# Patient Record
Sex: MALE | Race: OTHER | HISPANIC OR LATINO | URBAN - METROPOLITAN AREA
[De-identification: names, ages, dates, MRNs, and addresses within clinical notes are randomized per-mention and may not be internally consistent; named-entity substitution may affect disease eponyms.]

---

## 2020-05-26 ENCOUNTER — EMERGENCY (EMERGENCY)
Facility: HOSPITAL | Age: 30
LOS: 1 days | Discharge: HOME | End: 2020-05-26
Attending: STUDENT IN AN ORGANIZED HEALTH CARE EDUCATION/TRAINING PROGRAM
Payer: MEDICAID

## 2020-05-26 VITALS
SYSTOLIC BLOOD PRESSURE: 126 MMHG | HEART RATE: 97 BPM | TEMPERATURE: 99 F | RESPIRATION RATE: 18 BRPM | DIASTOLIC BLOOD PRESSURE: 78 MMHG | OXYGEN SATURATION: 98 %

## 2020-05-26 LAB
ALBUMIN SERPL ELPH-MCNC: 3.6 G/DL — SIGNIFICANT CHANGE UP (ref 3.3–5)
ALP SERPL-CCNC: 68 U/L — SIGNIFICANT CHANGE UP (ref 40–120)
ALT FLD-CCNC: 24 U/L — SIGNIFICANT CHANGE UP (ref 4–41)
ANION GAP SERPL CALC-SCNC: 14 MMO/L — SIGNIFICANT CHANGE UP (ref 7–14)
AST SERPL-CCNC: 33 U/L — SIGNIFICANT CHANGE UP (ref 4–40)
BASE EXCESS BLDV CALC-SCNC: 6 MMOL/L — SIGNIFICANT CHANGE UP
BASOPHILS # BLD AUTO: 0.05 K/UL — SIGNIFICANT CHANGE UP (ref 0–0.2)
BASOPHILS NFR BLD AUTO: 0.7 % — SIGNIFICANT CHANGE UP (ref 0–2)
BILIRUB SERPL-MCNC: 0.3 MG/DL — SIGNIFICANT CHANGE UP (ref 0.2–1.2)
BLOOD GAS VENOUS - CREATININE: SIGNIFICANT CHANGE UP MG/DL (ref 0.5–1.3)
BLOOD GAS VENOUS - FIO2: 21 — SIGNIFICANT CHANGE UP
BUN SERPL-MCNC: 6 MG/DL — LOW (ref 7–23)
CALCIUM SERPL-MCNC: 8.5 MG/DL — SIGNIFICANT CHANGE UP (ref 8.4–10.5)
CHLORIDE BLDV-SCNC: 99 MMOL/L — SIGNIFICANT CHANGE UP (ref 96–108)
CHLORIDE SERPL-SCNC: 98 MMOL/L — SIGNIFICANT CHANGE UP (ref 98–107)
CO2 SERPL-SCNC: 27 MMOL/L — SIGNIFICANT CHANGE UP (ref 22–31)
CREAT SERPL-MCNC: 0.77 MG/DL — SIGNIFICANT CHANGE UP (ref 0.5–1.3)
EOSINOPHIL # BLD AUTO: 0.1 K/UL — SIGNIFICANT CHANGE UP (ref 0–0.5)
EOSINOPHIL NFR BLD AUTO: 1.3 % — SIGNIFICANT CHANGE UP (ref 0–6)
GAS PNL BLDV: 138 MMOL/L — SIGNIFICANT CHANGE UP (ref 136–146)
GLUCOSE BLDV-MCNC: 106 MG/DL — HIGH (ref 70–99)
GLUCOSE SERPL-MCNC: 111 MG/DL — HIGH (ref 70–99)
HCO3 BLDV-SCNC: 29 MMOL/L — HIGH (ref 20–27)
HCT VFR BLD CALC: 40.7 % — SIGNIFICANT CHANGE UP (ref 39–50)
HCT VFR BLDV CALC: 42.8 % — SIGNIFICANT CHANGE UP (ref 39–51)
HGB BLD-MCNC: 13.6 G/DL — SIGNIFICANT CHANGE UP (ref 13–17)
HGB BLDV-MCNC: 13.9 G/DL — SIGNIFICANT CHANGE UP (ref 13–17)
IMM GRANULOCYTES NFR BLD AUTO: 0.3 % — SIGNIFICANT CHANGE UP (ref 0–1.5)
LACTATE BLDV-MCNC: 0.9 MMOL/L — SIGNIFICANT CHANGE UP (ref 0.5–2)
LYMPHOCYTES # BLD AUTO: 2.38 K/UL — SIGNIFICANT CHANGE UP (ref 1–3.3)
LYMPHOCYTES # BLD AUTO: 31.7 % — SIGNIFICANT CHANGE UP (ref 13–44)
MCHC RBC-ENTMCNC: 28.3 PG — SIGNIFICANT CHANGE UP (ref 27–34)
MCHC RBC-ENTMCNC: 33.4 % — SIGNIFICANT CHANGE UP (ref 32–36)
MCV RBC AUTO: 84.6 FL — SIGNIFICANT CHANGE UP (ref 80–100)
MONOCYTES # BLD AUTO: 0.86 K/UL — SIGNIFICANT CHANGE UP (ref 0–0.9)
MONOCYTES NFR BLD AUTO: 11.5 % — SIGNIFICANT CHANGE UP (ref 2–14)
NEUTROPHILS # BLD AUTO: 4.09 K/UL — SIGNIFICANT CHANGE UP (ref 1.8–7.4)
NEUTROPHILS NFR BLD AUTO: 54.5 % — SIGNIFICANT CHANGE UP (ref 43–77)
NRBC # FLD: 0 K/UL — SIGNIFICANT CHANGE UP (ref 0–0)
PCO2 BLDV: 48 MMHG — SIGNIFICANT CHANGE UP (ref 41–51)
PH BLDV: 7.42 PH — SIGNIFICANT CHANGE UP (ref 7.32–7.43)
PLATELET # BLD AUTO: 264 K/UL — SIGNIFICANT CHANGE UP (ref 150–400)
PMV BLD: 9.6 FL — SIGNIFICANT CHANGE UP (ref 7–13)
PO2 BLDV: 76 MMHG — HIGH (ref 35–40)
POTASSIUM BLDV-SCNC: 2.7 MMOL/L — CRITICAL LOW (ref 3.4–4.5)
POTASSIUM SERPL-MCNC: 2.8 MMOL/L — CRITICAL LOW (ref 3.5–5.3)
POTASSIUM SERPL-SCNC: 2.8 MMOL/L — CRITICAL LOW (ref 3.5–5.3)
PROT SERPL-MCNC: 6.2 G/DL — SIGNIFICANT CHANGE UP (ref 6–8.3)
RBC # BLD: 4.81 M/UL — SIGNIFICANT CHANGE UP (ref 4.2–5.8)
RBC # FLD: 13.3 % — SIGNIFICANT CHANGE UP (ref 10.3–14.5)
SAO2 % BLDV: 95.3 % — HIGH (ref 60–85)
SODIUM SERPL-SCNC: 139 MMOL/L — SIGNIFICANT CHANGE UP (ref 135–145)
WBC # BLD: 7.5 K/UL — SIGNIFICANT CHANGE UP (ref 3.8–10.5)
WBC # FLD AUTO: 7.5 K/UL — SIGNIFICANT CHANGE UP (ref 3.8–10.5)

## 2020-05-26 PROCEDURE — 99284 EMERGENCY DEPT VISIT MOD MDM: CPT

## 2020-05-26 PROCEDURE — 71045 X-RAY EXAM CHEST 1 VIEW: CPT | Mod: 26

## 2020-05-26 RX ORDER — KETOROLAC TROMETHAMINE 30 MG/ML
15 SYRINGE (ML) INJECTION ONCE
Refills: 0 | Status: DISCONTINUED | OUTPATIENT
Start: 2020-05-26 | End: 2020-05-26

## 2020-05-26 RX ORDER — ONDANSETRON 8 MG/1
4 TABLET, FILM COATED ORAL ONCE
Refills: 0 | Status: COMPLETED | OUTPATIENT
Start: 2020-05-26 | End: 2020-05-26

## 2020-05-26 RX ORDER — POTASSIUM CHLORIDE 20 MEQ
40 PACKET (EA) ORAL ONCE
Refills: 0 | Status: COMPLETED | OUTPATIENT
Start: 2020-05-26 | End: 2020-05-26

## 2020-05-26 RX ORDER — SODIUM CHLORIDE 9 MG/ML
1000 INJECTION, SOLUTION INTRAVENOUS
Refills: 0 | Status: DISCONTINUED | OUTPATIENT
Start: 2020-05-26 | End: 2020-05-27

## 2020-05-26 RX ORDER — FAMOTIDINE 10 MG/ML
20 INJECTION INTRAVENOUS ONCE
Refills: 0 | Status: COMPLETED | OUTPATIENT
Start: 2020-05-26 | End: 2020-05-26

## 2020-05-26 RX ORDER — SODIUM CHLORIDE 9 MG/ML
1000 INJECTION INTRAMUSCULAR; INTRAVENOUS; SUBCUTANEOUS ONCE
Refills: 0 | Status: COMPLETED | OUTPATIENT
Start: 2020-05-26 | End: 2020-05-26

## 2020-05-26 RX ORDER — POTASSIUM CHLORIDE 20 MEQ
10 PACKET (EA) ORAL
Refills: 0 | Status: DISCONTINUED | OUTPATIENT
Start: 2020-05-26 | End: 2020-05-30

## 2020-05-26 RX ADMIN — SODIUM CHLORIDE 1000 MILLILITER(S): 9 INJECTION INTRAMUSCULAR; INTRAVENOUS; SUBCUTANEOUS at 21:48

## 2020-05-26 RX ADMIN — Medication 40 MILLIEQUIVALENT(S): at 23:50

## 2020-05-26 RX ADMIN — Medication 15 MILLIGRAM(S): at 23:44

## 2020-05-26 RX ADMIN — FAMOTIDINE 20 MILLIGRAM(S): 10 INJECTION INTRAVENOUS at 21:47

## 2020-05-26 RX ADMIN — SODIUM CHLORIDE 1000 MILLILITER(S): 9 INJECTION INTRAMUSCULAR; INTRAVENOUS; SUBCUTANEOUS at 23:13

## 2020-05-26 RX ADMIN — ONDANSETRON 4 MILLIGRAM(S): 8 TABLET, FILM COATED ORAL at 21:48

## 2020-05-26 NOTE — ED PROVIDER NOTE - NSFOLLOWUPINSTRUCTIONS_ED_ALL_ED_FT
Zofran 4mg disintegrating tablet as needed for nausea or vomiting  Plenty of liquids  Citrus fruits, greens, bananas  Your potassium was very low in the Emergency Department.
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
## 2020-05-26 NOTE — ED PROVIDER NOTE - PATIENT PORTAL LINK FT
You can access the FollowMyHealth Patient Portal offered by [facility] by registering at the following website: [email or web address]. By joining [employer]’s FollowMyHealth portal, you will also be able to view your health information using other applications (apps) compatible with our system.

## 2020-05-26 NOTE — ED ADULT TRIAGE NOTE - CHIEF COMPLAINT QUOTE
Pt is c/o abdominal pain and vomiting several times since more than a month. Pt says he was seen at [facility] 5 days ago after he had passed out and he was told that his kidney function is not good but he signed out from the hospital . Denies any PMH. Not on any medications.

## 2020-05-26 NOTE — ED PROVIDER NOTE - ATTENDING CONTRIBUTION TO CARE
Seen and examined, well-developed M c/o episodic vomiting for nearly 1 mo., worse recently, states has been able to keep down po liquids in between but today had many episodes of vomiting. Denies fever/chills, no abd pain/back pain, no diarrhea. No post tussive emesis, no melena, no BRBPR, states has dark urine but still making normal amt. of urine. MMM, clear lungs, heart reg, abd soft, NT to palp, no CVAt, no edema, NT calves. Seen and examined, well-developed M c/o episodic vomiting for nearly 1 mo., worse recently, states has been able to keep down po liquids in between but today had many episodes of vomiting. Denies fever/chills, no abd pain/back pain, no diarrhea. No post tussive emesis, no melena, no BRBPR, states has dark urine but still making normal amt. of urine. MMM, clear lungs, heart reg, abd soft, NT to palp, no CVAt, no edema, NT  calves.

## 2020-05-26 NOTE — ED ADULT NURSE NOTE - OBJECTIVE STATEMENT
Pt comes in for N/V that has been ongoing "for months". Pt states today he ate eggs at 9am and after had several vomiting episodes. Pt appears in no distress at this time and noted to have even and unlabored respirations. Pt axo4 and ambulatory with a steady gait and without assistance. Pt states he has not been taking his medication (suboxone, seroquel) at home for about 2 weeks now. will continue to monitor pt. Will continue to monitor.

## 2020-05-26 NOTE — ED ADULT NURSE NOTE - CHIEF COMPLAINT QUOTE
Pt is c/o abdominal pain and vomiting several times since more than a month. Pt says he was seen at [facility] 5 days ago after he had passed out and he was told that his kidney function is not good but he signed out from the hospital . Denies any PMH. Not on any medications.

## 2020-05-27 VITALS
TEMPERATURE: 97 F | OXYGEN SATURATION: 98 % | DIASTOLIC BLOOD PRESSURE: 82 MMHG | RESPIRATION RATE: 18 BRPM | HEART RATE: 82 BPM | SYSTOLIC BLOOD PRESSURE: 132 MMHG

## 2020-05-27 RX ORDER — ONDANSETRON 8 MG/1
1 TABLET, FILM COATED ORAL
Qty: 15 | Refills: 0
Start: 2020-05-27 | End: 2020-05-31

## 2020-05-27 RX ADMIN — Medication 10 MILLIEQUIVALENT(S): at 00:50

## 2020-05-27 RX ADMIN — Medication 100 MILLIEQUIVALENT(S): at 00:00

## 2020-05-27 NOTE — ED ADULT NURSE REASSESSMENT NOTE - NS ED NURSE REASSESS COMMENT FT1
Pt states potassium burns too much. Potassium set at a slower rate. Pt states "I want this out, I don't want to be here, can you please call the doctor". Pt explained the risks of not receiving potassium. Potassium on hold at this time. Provider made aware. Will continue to monitor. Detail Level: Zone Modify Regimen: Isotretinoin 40md QD due to Pt request because of dryness Initiate Treatment: Valtrex

## 2020-10-31 ENCOUNTER — EMERGENCY (EMERGENCY)
Facility: HOSPITAL | Age: 30
LOS: 1 days | Discharge: HOME | End: 2020-10-31
Attending: EMERGENCY MEDICINE | Admitting: EMERGENCY MEDICINE
Payer: MEDICAID

## 2020-10-31 VITALS
DIASTOLIC BLOOD PRESSURE: 67 MMHG | HEIGHT: 69 IN | OXYGEN SATURATION: 96 % | HEART RATE: 70 BPM | RESPIRATION RATE: 16 BRPM | SYSTOLIC BLOOD PRESSURE: 111 MMHG | TEMPERATURE: 99 F

## 2020-10-31 VITALS
RESPIRATION RATE: 16 BRPM | OXYGEN SATURATION: 100 % | SYSTOLIC BLOOD PRESSURE: 113 MMHG | DIASTOLIC BLOOD PRESSURE: 51 MMHG | TEMPERATURE: 99 F | HEART RATE: 61 BPM

## 2020-10-31 PROBLEM — F31.9 BIPOLAR DISORDER, UNSPECIFIED: Chronic | Status: ACTIVE | Noted: 2020-05-26

## 2020-10-31 LAB
ANION GAP SERPL CALC-SCNC: 8 MMO/L — SIGNIFICANT CHANGE UP (ref 7–14)
BUN SERPL-MCNC: 13 MG/DL — SIGNIFICANT CHANGE UP (ref 7–23)
CALCIUM SERPL-MCNC: 8.8 MG/DL — SIGNIFICANT CHANGE UP (ref 8.4–10.5)
CHLORIDE SERPL-SCNC: 103 MMOL/L — SIGNIFICANT CHANGE UP (ref 98–107)
CK SERPL-CCNC: 699 U/L — HIGH (ref 30–200)
CO2 SERPL-SCNC: 26 MMOL/L — SIGNIFICANT CHANGE UP (ref 22–31)
CREAT SERPL-MCNC: 0.77 MG/DL — SIGNIFICANT CHANGE UP (ref 0.5–1.3)
GLUCOSE SERPL-MCNC: 97 MG/DL — SIGNIFICANT CHANGE UP (ref 70–99)
POTASSIUM SERPL-MCNC: 4 MMOL/L — SIGNIFICANT CHANGE UP (ref 3.5–5.3)
POTASSIUM SERPL-SCNC: 4 MMOL/L — SIGNIFICANT CHANGE UP (ref 3.5–5.3)
SODIUM SERPL-SCNC: 137 MMOL/L — SIGNIFICANT CHANGE UP (ref 135–145)

## 2020-10-31 PROCEDURE — 72125 CT NECK SPINE W/O DYE: CPT | Mod: 26

## 2020-10-31 PROCEDURE — 70486 CT MAXILLOFACIAL W/O DYE: CPT | Mod: 26

## 2020-10-31 PROCEDURE — 70450 CT HEAD/BRAIN W/O DYE: CPT | Mod: 26

## 2020-10-31 PROCEDURE — 99284 EMERGENCY DEPT VISIT MOD MDM: CPT | Mod: 25

## 2020-10-31 PROCEDURE — 12013 RPR F/E/E/N/L/M 2.6-5.0 CM: CPT
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
## 2020-10-31 NOTE — ED PROVIDER NOTE - PATIENT PORTAL LINK FT
You can access the FollowMyHealth Patient Portal offered by [facility] by registering at the following website: [email or web address]. By joining [employer]’s FollowMyHealth portal, you will also be able to view your health information using other applications (apps) compatible with our system.

## 2020-10-31 NOTE — ED PROVIDER NOTE - PHYSICAL EXAMINATION
Skin: 1.5cm superficial linear laceration below left eyelid  +deep laceration to upper inner lip  +ecchymoses and mild swelling to left orbit, EOMs intact  +pinpoint pupils on exam Skin: 1.5cm superficial linear laceration below left eyelid  +deep laceration to upper inner lip  +ecchymoses and mild swelling to left orbit, EOMs intact  +pinpoint pupils on exam  no midline spinal tenderness, FROM of upper and lower extremities, sensation intact and equal b/l Skin: 1.5cm superficial linear laceration below left eyelid  +deep laceration to upper inner lip  +ecchymoses and mild swelling to left orbit, EOMs intact  +pinpoint pupils on exam  no midline spinal tenderness, FROM of upper and lower extremities, sensation intact and equal b/l  distal pulses intact and equal, FROM of hands, no bony tenderness, sensation intact and equal

## 2020-10-31 NOTE — ED ADULT NURSE NOTE - INTERVENTIONS DEFINITIONS
Non-slip footwear when patient is off stretcher/Monitor gait and stability/Stretcher in lowest position, wheels locked, appropriate side rails in place/Instruct patient to call for assistance/Physically safe environment: no spills, clutter or unnecessary equipment
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
## 2020-10-31 NOTE — ED PROVIDER NOTE - NSFOLLOWUPINSTRUCTIONS_ED_ALL_ED_FT
Follow up with your primary care doctor within 1 week  Take Ibuprofen 600mg every 8 hours as needed for pain, take with food  The sutures in your lip will dissolve  Return to the ER with any worsening or concerning symptoms, increased pain, numbness, weakness or any other concerns. Follow up with your primary care doctor within 1 week  Take Ibuprofen 600mg every 8 hours as needed for pain, take with food  The sutures in your lip will dissolve  Return to the ER with any worsening or concerning symptoms, increased pain, numbness, weakness, vomiting, changes to your vision or any other concerns. Follow up with your primary care doctor within 1 week  Take Ibuprofen 600mg every 8 hours as needed for pain, take with food  The sutures in your lip will dissolve  Drink plenty of fluids  Return to the ER with any worsening or concerning symptoms, increased pain, numbness, weakness, vomiting, changes to your vision or any other concerns.
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
## 2022-01-20 NOTE — ED PROVIDER NOTE - NSTIMEPROVIDERCAREINITIATE_GEN_ER
67 Y M former smoker with pmh PMH afib (Eliquis last dose____), HLD, CAD, HFpEF, PAD (s/p LLE SFA/pop/DANIE/pop/DANIE PTA, PCI SFA, DCB SFA and pop 12/23/2021 @[facility] cardiology office; RSFA severe disease BTK) who presents for peripheral catheterization secondary to [place] IV symptoms in the setting of known hx of PAD.     ASA III Mallampati III  Precath consented  Started IVF NS @ ---cc/h  Took daily dose of Plavix 75mg POx1 and ASA 81mg POX1 this AM    Patient is a candidate for moderate sedation.     Risks & benefits of procedure and alternative therapy have been explained to the patient including but not limited to: allergic reaction, bleeding w/possible need for blood transfusion, infection, renal and vascular compromise, limb damage, arrhythmia, stroke, vessel dissection/perforation, Myocardial infarction, emergent CABG. Informed consent obtained and in chart.  26-May-2020 21:10 67 Y M former smoker with pmh PMH afib (Eliquis last dose____), HLD, CAD, HFpEF, PAD (s/p LLE SFA/pop/DANIE/pop/DANIE PTA, PCI SFA, DCB SFA and pop 12/23/2021 @[facility] cardiology office; RSFA severe disease BTK) who presents for peripheral catheterization secondary to [place] IV symptoms in the setting of known hx of PAD.     ASA III Mallampati III  Precath consented  Started IVF NS @ 300cc/h as per protocol  Took daily dose of ASA 81mg POX1 this AM and given daily dose of Plavix 75mg POx1     Patient is a candidate for moderate sedation.     Risks & benefits of procedure and alternative therapy have been explained to the patient including but not limited to: allergic reaction, bleeding w/possible need for blood transfusion, infection, renal and vascular compromise, limb damage, arrhythmia, stroke, vessel dissection/perforation, Myocardial infarction, emergent CABG. Informed consent obtained and in chart.  67 Y M former smoker with pmh PMH afib (Eliquis last dose____), HLD, CAD, HFpEF, PAD (s/p LLE SFA/pop/DANIE/pop/DANIE PTA, PCI SFA, DCB SFA and pop 12/23/2021 @[facility] cardiology office; RSFA severe disease BTK) who presents for peripheral catheterization secondary to [place] IV symptoms in the setting of known hx of PAD.     ASA III Mallampati III  Precath consented  Started IVF NS @ 300cc/h as per protocol  Took daily dose of ASA 81mg POX1 this AM and given daily dose of Plavix 75mg POx1   K 3.6 repleted with KCL 40meq POx1    Patient is a candidate for moderate sedation.     Risks & benefits of procedure and alternative therapy have been explained to the patient including but not limited to: allergic reaction, bleeding w/possible need for blood transfusion, infection, renal and vascular compromise, limb damage, arrhythmia, stroke, vessel dissection/perforation, Myocardial infarction, emergent CABG. Informed consent obtained and in chart.

## 2022-05-27 ENCOUNTER — EMERGENCY (EMERGENCY)
Facility: HOSPITAL | Age: 32
LOS: 1 days | Discharge: HOME | End: 2022-05-27
Admitting: EMERGENCY MEDICINE
Payer: MEDICAID

## 2022-05-27 VITALS
TEMPERATURE: 100 F | SYSTOLIC BLOOD PRESSURE: 119 MMHG | OXYGEN SATURATION: 100 % | RESPIRATION RATE: 18 BRPM | DIASTOLIC BLOOD PRESSURE: 71 MMHG | HEIGHT: 69 IN | HEART RATE: 97 BPM

## 2022-05-27 PROCEDURE — 99285 EMERGENCY DEPT VISIT HI MDM: CPT

## 2022-05-27 PROCEDURE — 99053 MED SERV 10PM-8AM 24 HR FAC: CPT

## 2022-05-27 PROCEDURE — 70450 CT HEAD/BRAIN W/O DYE: CPT | Mod: 26,MA

## 2022-05-27 PROCEDURE — 72125 CT NECK SPINE W/O DYE: CPT | Mod: 26,MA

## 2022-05-27 RX ORDER — IBUPROFEN 200 MG
600 TABLET ORAL ONCE
Refills: 0 | Status: COMPLETED | OUTPATIENT
Start: 2022-05-27 | End: 2022-05-27

## 2022-05-27 NOTE — ED PROVIDER NOTE - PROGRESS NOTE DETAILS
Lizbeth Strickland DO (PGY1): Pt signed out to me by night team. Pt resting comfortably in bed. Ambulating in ED. Pt feels ready for discharge. Pending CT read. Lizbeth Strickland DO (PGY1): Pt with minimal pain on exam, continues to ambulate in ED with full neck ROM and no paresthesias. Pt would like to be dc'd. CT cervical spine negative for fracture. CT head showing disconjugate gaze. Pt made aware of lab and imaging results. Questions regarding their symptoms were addressed. Advised to follow up with primary care and optho. Given strict return precautions. Pt verbalized understanding. Lizbeth Strickland DO (PGY1): Pt with minimal pain on exam, continues to ambulate in ED with full neck ROM and no paresthesias. Pt would like to be dc'd. CT cervical spine negative for fracture. CT head showing disconjugate gaze. Pt with disconjugate gaze of R eye. Pt reports 2 mo of blurred vision and double vision. Advised that he should stay for ophthalmology cs. Pt understands and does not want to stay, states he will return to ED if any new vision changes occur. Will have hospital call him to set up urgent optho apt. Pt made aware of lab and imaging results. Questions regarding their symptoms were addressed. Advised to follow up with primary care and optho. Given strict return precautions. Pt verbalized understanding. Dr. Vital: Pt signed out to me at 7 AM from CLA[facility] Lerma with plan to follow up imaging.  Clinically pt well, walking around ED without difficulty, with nontender neck, and 5/5 strength in all 4 extremities, and no ataxia.  Imaging showing dysconjugate gaze.  I spoke to pt and he states that he has had right eye out of line with left eye for "years", and states that he sometimes sees "double" but that it has been present for "a few months", knows that it is because "one of the muscles isn't right", never saw an ophthalmologist because he was in [facility] and did not have access.  I discussed with pt that this is concerning and if he would be willing to wait for an ophthalmologist (although not an acute issue, given pt's social determinants of health, would help expedite care) we can call one.  Pt adamantly refusing to wait, states that he has to leave to  daughter.  Pt states he is not driving.  I emphasized importance of follow up with ophthalmology and pt appeared ot understand (states he knows he needs to see a doctor and not an ), will use discharge center to help arrange for opthalmology.

## 2022-05-27 NOTE — ED PROVIDER NOTE - ATTENDING APP SHARED VISIT CONTRIBUTION OF CARE
Dr. Vital: Pt signed out to me at 7 AM from [facility] with plan to follow up imaging.  My involved in this patient's care is documented in the progress note above.  Unless otherwise documented, I agree with the PA's evaluation, assessment, plan and documentation.

## 2022-05-27 NOTE — ED PROVIDER NOTE - NSFOLLOWUPCLINICS_GEN_ALL_ED_FT
[facility] - Ophthalmology  Ophthalmology  [address], [address]  [place] [address]  Phone: [phone]  Fax:     [facility] Ophthalmology  Ophthalmology  [address], [address]  [place] [address]  Phone: [phone]  Fax:

## 2022-05-27 NOTE — ED PROVIDER NOTE - NS ED ATTENDING STATEMENT MOD
The patient has been examined and the H&P has been reviewed:    I concur with the findings and no changes have occurred since H&P was written.    Anesthesia/Surgery risks, benefits and alternative options discussed and understood by patient/family.          Active Hospital Problems    Diagnosis  POA    Chronic pain [G89.29]  Yes      Resolved Hospital Problems   No resolved problems to display.      This was a shared visit with the ERIN. I reviewed and verified the documentation and independently performed the documented:

## 2022-05-27 NOTE — ED PROVIDER NOTE - NSFOLLOWUPINSTRUCTIONS_ED_ALL_ED_FT
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
## 2022-05-27 NOTE — ED PROVIDER NOTE - PATIENT PORTAL LINK FT
You can access the FollowMyHealth Patient Portal offered by [facility] by registering at the following website: [email or web address]. By joining [employer]’s FollowMyHealth portal, you will also be able to view your health information using other applications (apps) compatible with our system.
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
## 2022-07-06 NOTE — ED PROVIDER NOTE - NSICDXNOPASTSURGICALHX_GEN_ALL_ED
----- Message from Mike Grimes sent at 7/6/2022  9:29 AM EDT -----  Subject: Message to Provider    QUESTIONS  Information for Provider? Patient is requesting a refill on her   Minocin(minocycline), 100 mg tablets, taking 1 tablet daily Please refill   to [address]   ---------------------------------------------------------------------------  --------------  Chyna PEOPLES  [phone]; OK to leave message on voicemail  ---------------------------------------------------------------------------  --------------  SCRIPT ANSWERS  Relationship to Patient?  Self <-- Click to add NO significant Past Surgical History

## 2023-08-06 NOTE — ED PROVIDER NOTE - NS ED MD DISPO DISCHARGE CCDA
Patient/Caregiver provided printed discharge information.
Simple: Patient demonstrates quick and easy understanding/Verbalized Understanding

## 2025-01-14 ENCOUNTER — INPATIENT (INPATIENT)
Facility: HOSPITAL | Age: 35
LOS: 0 days | Discharge: LEFT BEFORE DISCHARGE | End: 2025-01-15
Attending: INTERNAL MEDICINE | Admitting: INTERNAL MEDICINE
Payer: MEDICAID

## 2025-01-14 VITALS
OXYGEN SATURATION: 99 % | HEART RATE: 106 BPM | TEMPERATURE: 98 F | RESPIRATION RATE: 18 BRPM | DIASTOLIC BLOOD PRESSURE: 88 MMHG | SYSTOLIC BLOOD PRESSURE: 142 MMHG | WEIGHT: 145.06 LBS

## 2025-01-14 PROCEDURE — 99285 EMERGENCY DEPT VISIT HI MDM: CPT

## 2025-01-14 NOTE — ED PROVIDER NOTE - NSFOLLOWUPINSTRUCTIONS_ED_ALL_ED_FT
YOU RECEIVED LOVENOX TODAY AND SHOULD TAKE 10 MG OF COUMADIN TONIGHT AND FOLLOWUP WITH YOUR DOCTORS FOR FURTHER MANAGEMENT    Deep Vein Thrombosis WHAT YOU NEED TO KNOW:    Deep vein thrombosis (DVT) is a blood clot that forms in a deep vein of the body. The DVT can break into smaller pieces and travel to your lungs and cause a blockage called a pulmonary embolism. A PE can become life-threatening. It is important to go to all follow-up appointments and to take blood thinners as directed. This is especially important if you were discharged home from the emergency department.  Thrombus and Embolus    DISCHARGE INSTRUCTIONS:    Call your local emergency number (911 in the [place]) if:    You feel lightheaded, short of breath, and have chest pain.    You cough up blood.  Call your doctor if:    Your arm or leg feels warm, tender, and painful. It may look swollen and red.    You have questions or concerns about your condition or care.  Medicines:    Blood thinners help prevent blood clots. Clots can cause strokes, heart attacks, and death. The following are general safety guidelines to follow while you are taking a blood thinner:  Watch for bleeding and bruising while you take blood thinners. Watch for bleeding from your gums or nose. Watch for blood in your urine and bowel movements. Use a soft washcloth on your skin, and a soft toothbrush to brush your teeth. This can keep your skin and gums from bleeding. If you shave, use an electric shaver. Do not play contact sports.    Tell your dentist and other healthcare providers that you take a blood thinner. Wear a bracelet or necklace that says you take this medicine.    Do not start or stop any other medicines unless your healthcare provider tells you to. Many medicines cannot be used with blood thinners.    Take your blood thinner exactly as prescribed by your healthcare provider. Do not skip does or take less than prescribed. Tell your provider right away if you forget to take your blood thinner, or if you take too much.    Warfarin is a blood thinner that you may need to take. The following are things you should be aware of if you take warfarin:  Foods and medicines can affect the amount of warfarin in your blood. Do not make major changes to your diet while you take warfarin. Warfarin works best when you eat about the same amount of vitamin K every day. Vitamin K is found in green leafy vegetables and certain other foods. Ask for more information about what to eat when you are taking warfarin.    You will need to see your healthcare provider for follow-up visits when you are on warfarin. You will need regular blood tests. These tests are used to decide how much medicine you need.    Take your medicine as directed. Contact your healthcare provider if you think your medicine is not helping or if you have side effects. Tell him or her if you are allergic to any medicine. Keep a list of the medicines, vitamins, and herbs you take. Include the amounts, and when and why you take them. Bring the list or the pill bottles to follow-up visits. Carry your medicine list with you in case of an emergency.  Manage a DVT:    Wear pressure stockings as directed. The stockings put pressure on your legs. This improves blood flow and helps prevent clots. Wear the stockings during the day. Do not wear them when you sleep.  Pressure Stockings       Elevate your legs above the level of your heart. Elevate your legs when you sit or lie down, as often as you can. This will help decrease swelling and pain. Prop your legs on pillows or blankets to keep them elevated comfortably.  Elevate Leg  Prevent a DVT:    Exercise regularly to help increase your blood flow. Walking is a good low-impact exercise. Talk to your healthcare provider about the best exercise plan for you.  Walking for Exercise      Change your body position or move around often. Move and stretch in your seat several times each hour if you travel by car or work at a desk. In an airplane, get up and walk every hour. Move your legs by tightening and releasing your leg muscles while sitting. You can move your legs while sitting by raising and lowering your heels. Keep your toes on the floor while you do this. You can also raise and lower your toes while keeping your heels on the floor.  DVT Prevention Heel Raise  DVT Prevention Toe Raise      Maintain a healthy weight. Ask your healthcare provider how much you should weigh. Ask him or her to help you create a weight loss plan if you are overweight.    Do not smoke. Nicotine and other chemicals in cigarettes and cigars can damage blood vessels and make it more difficult to manage your DVT. Ask your healthcare provider for information if you currently smoke and need help to quit. E-cigarettes or smokeless tobacco still contain nicotine. Talk to your healthcare provider before you use these products.    Ask about birth control if you are a woman who takes the pill. A birth control pill increases the risk for PE in certain women. The risk is higher if you are also older than 35, smoke cigarettes, or have a blood clotting disorder. Talk to your healthcare provider about other ways to prevent pregnancy, such as a cervical cap or intrauterine device (IUD).  Follow up with your doctor or specialist as directed: You may need to come in regularly for scans to check for blood clots. Your blood may checked to see how long it takes to clot. Your doctor or specialist will tell you if you need to have this test and how often to have it. Write down your questions so you remember to ask them during your visits.    USTED RECIBIÓ LOVENOX JEREMIAH Y DEBE BOBBY 10 MG DE COUMADIN ESTA NOCHE Y SEGUIMIENTO CON LINDA MÉDICOS PARA UN TRATAMIENTO ADICIONAL    Trombosis venosa profunda LO QUE NECESITA SABER:    La trombosis venosa profunda (TVP) es un coágulo de stephanie que se forma en jose juan vena profunda del cuerpo. La TVP puede romperse en pedazos más pequeños y viajar a linda pulmones y causar un bloqueo llamado embolia pulmonar. Jose Juan EP puede poner en peligro la sean. Es importante asistir a todas las citas de seguimiento y bobby anticoagulantes según las indicaciones. [place] es especialmente importante si fue dado de pam del departamento de emergencias.  Trombo y émbolo    INSTRUCCIONES DE DESCARGA:    Llame a thomas número de emergencia local (911 en los [place] Si:    Se siente mareado, le falta el aire y tiene dolor en el pecho.    Tose stephanie.  Llame a thomas médico si:    Thomas brazo o pierna se siente caliente, sensible y dolorido. Puede verse hinchado y enrojecido.    Tiene preguntas o inquietudes sobre thomas afección o atención.  Medicamentos:    Los anticoagulantes ayudan a prevenir los coágulos de stephanie. Los coágulos pueden causar accidentes cerebrovasculares, ataques cardíacos y la muerte. Las siguientes son pautas generales de seguridad que debe seguir mientras abdullahi un anticoagulante:  Esté atento a sangrado y hematomas mientras abdullahi anticoagulantes. Esté atento a sangrado de linda encías o nariz. Esté atento a la stephanie en la orina y las deposiciones. Use un paño suave sobre la piel y un cepillo de dientes suave para cepillarse los dientes. [place] puede evitar que le sangren la piel y las encías. Si te afeitas, usa jose juan afeitadora eléctrica. No practique deportes de contacto.    Informe a thomas dentista y a otros proveedores de atención médica que abdullahi un anticoagulante. Use jose juan pulsera o collar que diga que abdullahi ananda medicamento.    No empiece ni deje de bobby ningún otro medicamento a menos que se lo indique thomas proveedor de atención médica. Muchos medicamentos no se pueden usar con anticoagulantes.    [place] thomas anticoagulante exactamente waqas lo recetó thomas proveedor de atención médica. No omita dosis ni tome menos de lo recetado. Informe a thomas proveedor de inmediato si olvida bobby thomas anticoagulante o si abdullahi demasiado.    La warfarina es un anticoagulante que es posible que deba bobby. Las siguientes son cosas que debe tener en cuenta si abdullahi warfarina:  Los alimentos y los medicamentos pueden afectar la cantidad de warfarina en stephanie. No realice cambios importantes en thomas dieta mientras abdullahi warfarina. La warfarina funciona mejor cuando consume aproximadamente la misma cantidad de vitamina K todos los días. La vitamina K se encuentra en las verduras de hoja tara y en algunos otros alimentos. Pida más información sobre qué comer cuando esté tomando warfarina.    Deberá marilou a thomas proveedor de atención médica para visitas de seguimiento cuando esté tomando warfarina. Necesitará análisis de stephanie periódicos. Estas pruebas se utilizan para decidir la cantidad de medicamento que necesita.    [place] thomas medicamento según las indicaciones. Comuníquese con thomas proveedor de atención médica si martin que thomas medicamento no le está ayudando o si tiene efectos secundarios. Dígale si es alérgico a algún medicamento. Mantenga jose juan lista de los medicamentos, vitaminas y hierbas que abdullahi. Incluya las cantidades, cuándo y por qué las abdullahi. Lleve la lista o los frascos de pastillas a las visitas de seguimiento. Lleve thomas lista de medicamentos con usted en martita de jose juan emergencia.  Manejar jose juan TVP:    Use medias de presión waqas se le indique. Las medias ejercen presión sobre tus piernas. [place] mejora el flujo sanguíneo y ayuda a prevenir los coágulos. Use las medias ladonna el día. No los use cuando duerma.  Medias de presión      Eleve linda piernas por encima del nivel de thomas corazón. Eleve las piernas cuando se siente o se acueste, tan a menudo waqas pueda. [place] ayudará a disminuir la hinchazón y el dolor. Apoye las piernas sobre almohadas o mantas para mantenerlas elevadas cómodamente.  Elevar la pierna  Prevenir jose juan TVP:    Petrona ejercicio con regularidad para ayudar a aumentar el flujo sanguíneo. Caminar es un buen ejercicio de bajo impacto. Hable con thomas proveedor de atención médica sobre el mejor plan de ejercicios para usted.  Caminar para hacer ejercicio      Cambie la posición de thomas cuerpo o muévase con frecuencia. Muévase y estírese en thomas asiento varias veces por hora si viaja en automóvil o trabaja en un escritorio. En un avión, levántese y camine cada hora. Mueva las piernas apretando y soltando los músculos de las piernas mientras está sentado. Puede  las piernas mientras está sentado levantando y bajando los talones. Mantenga los dedos de los pies en el suelo mientras hace esto. También puede subir y bajar los dedos de los pies mientras mantiene los talones en el suelo.  Prevención de TVP Elevación del talón  Prevención de la TVP Elevación del dedo del pie      Mantener un peso saludable. Pregúntele a thomas proveedor de atención médica cuánto debe pesar. [place] que le ayude a crear un plan de pérdida de peso si tiene sobrepeso.    No fume. La nicotina y otras sustancias químicas de los cigarrillos y los puros pueden dañar los vasos sanguíneos y dificultar el manejo de la TVP. [place] información a thomas proveedor de atención médica si actualmente fuma y necesita ayuda para dejar de fumar. Los cigarrillos electrónicos o el tabaco sin humo todavía contienen nicotina. Hable con thomas proveedor de atención médica antes de usar estos productos.    Pregunte sobre anticonceptivos si es jose juan alee que abdullahi la píldora. Jose Juan píldora anticonceptiva aumenta el riesgo de EP en ciertas mujeres. El riesgo es mayor si también es mayor de 35 años, fuma cigarrillos o tiene un trastorno de la coagulación de la stephanie. Habla contigo

## 2025-01-14 NOTE — ED PROVIDER NOTE - CLINICAL SUMMARY MEDICAL DECISION MAKING FREE TEXT BOX
Patient signed out to Dr Haywood after following eval: The patient is a 34y Male who has a past medical and surgery history of Anxiety Depression Bipolar 1 disorder PTED with LE swelling and pain to his feet relates alot of walking of note footwear =crocs Swelling however goes up to bottom of knees and skin of LE is darker He has been seen at [facility] on multple occasions including a 2 day admission ; had negative w/u and was sent out on " water pills"  + nancy lower leg swelling. Denies chest pain . Denies shortness of breath PND chest pain of palpitations .     Vital Signs Last 24 Hrs  T(F): 98 HR: 89 BP: 138/87 (15 Dmitri 2025 01:44) (138/87 - 142/88)  RR: 18 SpO2: 99% (15 Dmitri 2025 01:44) (99% - 99%)  PE: as described; nancy swelling of LE tense with dusky changes c/w venous insufficiency     DATA:  EKG: pending at time of evaluation  LAB:                         11.2   11.29 )-----------( 726      ( 15 Dmitri 2025 00:38 )             35.2   01-15    137  |  101  |  11  ----------------------------<  88  3.7   |  22  |  0.69    Ca    9.0      15 Dmitri 2025 00:38    TPro  7.6  /  Alb  4.0  /  TBili  0.2  /  DBili  x   /  AST  31  /  ALT  45[H]  /  AlkPhos  116  01-15  Lipase: 37 U/L (01-15-25 @ 00:38)    US partial occlusion of LT popliteal /popliteal vein      IMPRESSION/RISK:  Dx= DVT     Consideration include will add on NT BNP at 3:34  as troponin pending at time of signout will obtain weight if w/u negative patient to be d/crescencio on Eliquis   Plan  As above  Further w/u as per results and response

## 2025-01-14 NOTE — ED PROVIDER NOTE - PROGRESS NOTE DETAILS
Yobany XAVIER: Pt signed out to me.  33 y/o M with h/o bipolar disorder, active tob smoker with bilat lower ext swelling since 12/28.  No fever, recent illness/uri sxs, cp, sob.  Currently denies any complaints.  US showing R gastroc nonocclusive DVT.  Initial trop elevated to 300s.  EKG NSR without any ischemic changes.  Will add on CK/CKMB, BNP still pending.  CTA chest considered, but pt without any sxs of PE (never had CP or SOB).  Will plan to start heparin for dvt, poss nstemi, admit. Yobany XAVIER: CTA neg for PE.  Cards consulted for elevated trop - they will see pt, recommend admission to tele, echo.  Pt updated on results and need for admission, remains asymptomatic, HD stable.

## 2025-01-14 NOTE — ED PROVIDER NOTE - SKIN, MLM
Skin normal color for race, warm, dry and intact. Darkening of skin of LE c/w CVI; + edema to below knee

## 2025-01-14 NOTE — ED PROVIDER NOTE - OBJECTIVE STATEMENT
The patient is a 34y Male who has a past medical and surgery history of Anxiety Depression Bipolar 1 disorder PTED with LE swelling and pain to his feet relates alot of walking of note footwear =crocs Swelling however goes up to bottom of knees and skin of LE is darker He has been seen at [facility] on multple occasions including a 2 day admission ; had negative w/u and was sent out on " water pills"  + nancy lower leg swelling. Denies chest pain . Denies shortness of breath PND chest pain of palpitations .

## 2025-01-14 NOTE — ED PROVIDER NOTE - CARE PLAN
Principal Discharge DX:	Acute deep vein thrombosis (DVT) of other specified vein of left lower extremity   1 Principal Discharge DX:	Acute deep vein thrombosis (DVT) of other specified vein of left lower extremity  Secondary Diagnosis:	Elevated troponin

## 2025-01-14 NOTE — ED ADULT TRIAGE NOTE - CHIEF COMPLAINT QUOTE
Pt st " Both  by feet  and lower legs are swelling and they hurt since December.I was seen at [facility] Jan 4 they said I am retaining water but didn't find out why. I was admitted for 2 days. I came for a second opinion. They told me to take waterpills for one week and follow up with primary Md. I don't have primary....just came over here.  " + nancy lower leg swelling. Denies chest pain . Denies shortness of breath.  Denies med hx.

## 2025-01-14 NOTE — ED ADULT TRIAGE NOTE - WEIGHT IN LBS
We will notify you of xray results    Recommend drinking more water (min 5-6 cups of water a day) and eating salty snacks    Clean out for constipation:   Start with 1-2 squares of chocolate Ex-Lax for 3-4 days plus 2 capfuls of Miralax twice daily for 3-4 days.   OR  Take 7 capfuls (or packets) and pour it into a 32oz bottle of Gatorade. Shake well. Have your child drink this over 1 hour. Your child should start having watery poops in the next hour. If they do not have watery, clear poop after 1 bottle, then do this again (7 capfuls in 32 oz of Gatorade) and drink over the next hour.    Then start Miralax 1 capful once daily. Goal is to have 1-2 soft stools daily. Increase fiber, water intake, and decrease dairy to less than 24 oz/day. Decrease 'ABC' foods (apples, bananas and carrots) and increase 'P' foods (prunes, peaches, pears, peas). May start juice (apple, pear, prune) daily. Return if symptoms worsen, if the patient has abdominal pain, vomiting, or blood in the stool.      For headaches- recommend she be seen by her eye doctor  Keep a diary - when, location in head, severity, triggers, alleviated or aggravating factors   Recommended starting ibuprofen or tylenol scheduled for headaches then wean down  Recommend drinking 1 caffinated soda per day  Recommend drinking 5-6 bottles of water daily and additional water for activities. Recommend eating well balanced diet, getting enough sleep, and exercising regularly       Return in 2-3 months to recheck symptoms or sooner if symptoms worsen.      82 yo Indonesian speaking F with a PMH of, HTN, adenomatous nodular goiter, and AFib on Xarelto for at least 3 yrs now who was recently hospitalized earlier this month for new acutely decompensated HF (LVEF 20-25%) w/ BNP ~15K and rapid AFib in the 130s; managed on BIPAP briefly in the ED, Dig loaded, diuresed and dc'd on GDMT for HF. Xarelto adjusted for CrCl.   Plan to cont rate control with metop, decrease entresto dose. Follow-up as outpt. 145 denies

## 2025-01-15 VITALS
HEART RATE: 83 BPM | OXYGEN SATURATION: 98 % | RESPIRATION RATE: 16 BRPM | SYSTOLIC BLOOD PRESSURE: 123 MMHG | TEMPERATURE: 98 F | DIASTOLIC BLOOD PRESSURE: 64 MMHG

## 2025-01-15 DIAGNOSIS — I82.492 ACUTE EMBOLISM AND THROMBOSIS OF OTHER SPECIFIED DEEP VEIN OF LEFT LOWER EXTREMITY: ICD-10-CM

## 2025-01-15 LAB
ADD ON TEST-SPECIMEN IN LAB: SIGNIFICANT CHANGE UP
ALBUMIN SERPL ELPH-MCNC: 4 G/DL — SIGNIFICANT CHANGE UP (ref 3.3–5)
ALP SERPL-CCNC: 116 U/L — SIGNIFICANT CHANGE UP (ref 40–120)
ALT FLD-CCNC: 45 U/L — HIGH (ref 4–41)
ANION GAP SERPL CALC-SCNC: 14 MMOL/L — SIGNIFICANT CHANGE UP (ref 7–14)
APPEARANCE UR: CLEAR — SIGNIFICANT CHANGE UP
APTT BLD: 27.5 SEC — SIGNIFICANT CHANGE UP (ref 24.5–35.6)
AST SERPL-CCNC: 31 U/L — SIGNIFICANT CHANGE UP (ref 4–40)
BASOPHILS # BLD AUTO: 0.07 K/UL — SIGNIFICANT CHANGE UP (ref 0–0.2)
BASOPHILS NFR BLD AUTO: 0.6 % — SIGNIFICANT CHANGE UP (ref 0–2)
BILIRUB SERPL-MCNC: 0.2 MG/DL — SIGNIFICANT CHANGE UP (ref 0.2–1.2)
BILIRUB UR-MCNC: NEGATIVE — SIGNIFICANT CHANGE UP
BUN SERPL-MCNC: 11 MG/DL — SIGNIFICANT CHANGE UP (ref 7–23)
CALCIUM SERPL-MCNC: 9 MG/DL — SIGNIFICANT CHANGE UP (ref 8.4–10.5)
CHLORIDE SERPL-SCNC: 101 MMOL/L — SIGNIFICANT CHANGE UP (ref 98–107)
CO2 SERPL-SCNC: 22 MMOL/L — SIGNIFICANT CHANGE UP (ref 22–31)
COLOR SPEC: YELLOW — SIGNIFICANT CHANGE UP
CREAT SERPL-MCNC: 0.69 MG/DL — SIGNIFICANT CHANGE UP (ref 0.5–1.3)
DIFF PNL FLD: NEGATIVE — SIGNIFICANT CHANGE UP
EGFR: 123 ML/MIN/1.73M2 — SIGNIFICANT CHANGE UP
EOSINOPHIL # BLD AUTO: 0.12 K/UL — SIGNIFICANT CHANGE UP (ref 0–0.5)
EOSINOPHIL NFR BLD AUTO: 1.1 % — SIGNIFICANT CHANGE UP (ref 0–6)
GLUCOSE SERPL-MCNC: 88 MG/DL — SIGNIFICANT CHANGE UP (ref 70–99)
GLUCOSE UR QL: NEGATIVE MG/DL — SIGNIFICANT CHANGE UP
HCT VFR BLD CALC: 35.2 % — LOW (ref 39–50)
HGB BLD-MCNC: 11.2 G/DL — LOW (ref 13–17)
IANC: 7.8 K/UL — HIGH (ref 1.8–7.4)
IMM GRANULOCYTES NFR BLD AUTO: 0.4 % — SIGNIFICANT CHANGE UP (ref 0–0.9)
INR BLD: 1.19 RATIO — HIGH (ref 0.85–1.16)
KETONES UR-MCNC: NEGATIVE MG/DL — SIGNIFICANT CHANGE UP
LEUKOCYTE ESTERASE UR-ACNC: NEGATIVE — SIGNIFICANT CHANGE UP
LIDOCAIN IGE QN: 37 U/L — SIGNIFICANT CHANGE UP (ref 7–60)
LYMPHOCYTES # BLD AUTO: 2.54 K/UL — SIGNIFICANT CHANGE UP (ref 1–3.3)
LYMPHOCYTES # BLD AUTO: 22.5 % — SIGNIFICANT CHANGE UP (ref 13–44)
MCHC RBC-ENTMCNC: 27.1 PG — SIGNIFICANT CHANGE UP (ref 27–34)
MCHC RBC-ENTMCNC: 31.8 G/DL — LOW (ref 32–36)
MCV RBC AUTO: 85.2 FL — SIGNIFICANT CHANGE UP (ref 80–100)
MONOCYTES # BLD AUTO: 0.72 K/UL — SIGNIFICANT CHANGE UP (ref 0–0.9)
MONOCYTES NFR BLD AUTO: 6.4 % — SIGNIFICANT CHANGE UP (ref 2–14)
NEUTROPHILS # BLD AUTO: 7.8 K/UL — HIGH (ref 1.8–7.4)
NEUTROPHILS NFR BLD AUTO: 69 % — SIGNIFICANT CHANGE UP (ref 43–77)
NITRITE UR-MCNC: NEGATIVE — SIGNIFICANT CHANGE UP
NRBC # BLD: 0 /100 WBCS — SIGNIFICANT CHANGE UP (ref 0–0)
NRBC # FLD: 0 K/UL — SIGNIFICANT CHANGE UP (ref 0–0)
PH UR: 5.5 — SIGNIFICANT CHANGE UP (ref 5–8)
PLATELET # BLD AUTO: 726 K/UL — HIGH (ref 150–400)
POTASSIUM SERPL-MCNC: 3.7 MMOL/L — SIGNIFICANT CHANGE UP (ref 3.5–5.3)
POTASSIUM SERPL-SCNC: 3.7 MMOL/L — SIGNIFICANT CHANGE UP (ref 3.5–5.3)
PROT SERPL-MCNC: 7.6 G/DL — SIGNIFICANT CHANGE UP (ref 6–8.3)
PROT UR-MCNC: SIGNIFICANT CHANGE UP MG/DL
PROTHROM AB SERPL-ACNC: 13.8 SEC — HIGH (ref 9.9–13.4)
RBC # BLD: 4.13 M/UL — LOW (ref 4.2–5.8)
RBC # FLD: 16.7 % — HIGH (ref 10.3–14.5)
SODIUM SERPL-SCNC: 137 MMOL/L — SIGNIFICANT CHANGE UP (ref 135–145)
SP GR SPEC: 1.03 — SIGNIFICANT CHANGE UP (ref 1–1.03)
TROPONIN T, HIGH SENSITIVITY RESULT: 294 NG/L — CRITICAL HIGH
TROPONIN T, HIGH SENSITIVITY RESULT: 335 NG/L — CRITICAL HIGH
UROBILINOGEN FLD QL: 0.2 MG/DL — SIGNIFICANT CHANGE UP (ref 0.2–1)
WBC # BLD: 11.29 K/UL — HIGH (ref 3.8–10.5)
WBC # FLD AUTO: 11.29 K/UL — HIGH (ref 3.8–10.5)

## 2025-01-15 PROCEDURE — 99223 1ST HOSP IP/OBS HIGH 75: CPT

## 2025-01-15 PROCEDURE — 93970 EXTREMITY STUDY: CPT | Mod: 26

## 2025-01-15 PROCEDURE — 71275 CT ANGIOGRAPHY CHEST: CPT | Mod: 26

## 2025-01-15 PROCEDURE — 71046 X-RAY EXAM CHEST 2 VIEWS: CPT | Mod: 26

## 2025-01-15 RX ORDER — ASPIRIN 81 MG
324 TABLET, DELAYED RELEASE (ENTERIC COATED) ORAL ONCE
Refills: 0 | Status: COMPLETED | OUTPATIENT
Start: 2025-01-15 | End: 2025-01-15

## 2025-01-15 RX ORDER — HEPARIN SODIUM 1000 [USP'U]/ML
6000 INJECTION, SOLUTION INTRAVENOUS; SUBCUTANEOUS ONCE
Refills: 0 | Status: COMPLETED | OUTPATIENT
Start: 2025-01-15 | End: 2025-01-15

## 2025-01-15 RX ORDER — HEPARIN SODIUM 1000 [USP'U]/ML
INJECTION, SOLUTION INTRAVENOUS; SUBCUTANEOUS
Qty: 25000 | Refills: 0 | Status: DISCONTINUED | OUTPATIENT
Start: 2025-01-15 | End: 2025-01-15

## 2025-01-15 RX ORDER — APIXABAN 5 MG/1
1 TABLET, FILM COATED ORAL
Qty: 74 | Refills: 0
Start: 2025-01-15 | End: 2025-02-13

## 2025-01-15 RX ORDER — ALPRAZOLAM 0.25 MG/1
0 TABLET ORAL
Qty: 0 | Refills: 0 | DISCHARGE

## 2025-01-15 RX ORDER — HEPARIN SODIUM 1000 [USP'U]/ML
6000 INJECTION, SOLUTION INTRAVENOUS; SUBCUTANEOUS EVERY 6 HOURS
Refills: 0 | Status: DISCONTINUED | OUTPATIENT
Start: 2025-01-15 | End: 2025-01-15

## 2025-01-15 RX ORDER — QUETIAPINE FUMARATE 100 MG/1
0 TABLET, FILM COATED ORAL
Qty: 0 | Refills: 0 | DISCHARGE

## 2025-01-15 RX ORDER — HEPARIN SODIUM 1000 [USP'U]/ML
3000 INJECTION, SOLUTION INTRAVENOUS; SUBCUTANEOUS EVERY 6 HOURS
Refills: 0 | Status: DISCONTINUED | OUTPATIENT
Start: 2025-01-15 | End: 2025-01-15

## 2025-01-15 RX ADMIN — HEPARIN SODIUM 6000 UNIT(S): 1000 INJECTION, SOLUTION INTRAVENOUS; SUBCUTANEOUS at 05:45

## 2025-01-15 RX ADMIN — HEPARIN SODIUM 1300 UNIT(S)/HR: 1000 INJECTION, SOLUTION INTRAVENOUS; SUBCUTANEOUS at 05:43

## 2025-01-15 RX ADMIN — HEPARIN SODIUM 1300 UNIT(S)/HR: 1000 INJECTION, SOLUTION INTRAVENOUS; SUBCUTANEOUS at 11:19

## 2025-01-15 RX ADMIN — Medication 324 MILLIGRAM(S): at 06:25

## 2025-01-15 NOTE — DISCHARGE NOTE PROVIDER - NSDCCPCAREPLAN_GEN_ALL_CORE_FT
PRINCIPAL DISCHARGE DIAGNOSIS  Diagnosis: Acute deep vein thrombosis (DVT) of other specified vein of left lower extremity  Assessment and Plan of Treatment: You were started on heparin while admitted. You refused to remain admitted to further work up. You were sent home with Eliquis starter pack and need to follow up with hematologist      SECONDARY DISCHARGE DIAGNOSES  Diagnosis: Elevated troponin  Assessment and Plan of Treatment:      PRINCIPAL DISCHARGE DIAGNOSIS  Diagnosis: Acute deep vein thrombosis (DVT) of other specified vein of left lower extremity  Assessment and Plan of Treatment: You were started on heparin while admitted. You refused to remain admitted to further work up. You were sent home with Eliquis starter pack and need to follow up with hematologist within 3-5 days. You were also found to have an abnormality on your left lower extremity with enlarged muscle. You were recommended for an MRI of left lower extremity, but signed out against medical advice prior to its completion. You should arrange for MRI with hematologist or PCP as outpt as soon as possible.      SECONDARY DISCHARGE DIAGNOSES  Diagnosis: Elevated troponin  Assessment and Plan of Treatment: You were seen by cardiologist and recommended for echocardiogram. You refused to remain inpatient for echocardiogram and left against medical advice. Follow up with cardiologist as outpt for further management and echocardiogram as outpt.     PRINCIPAL DISCHARGE DIAGNOSIS  Diagnosis: Acute deep vein thrombosis (DVT) of other specified vein of left lower extremity  Assessment and Plan of Treatment: You were started on heparin while admitted. You refused to remain admitted to further work up. You were sent home with Eliquis starter pack and need to follow up with hematologist within 3-5 days. You were also found to have an abnormality on your left lower extremity with enlarged muscle. You were recommended for an MRI of left lower extremity, but signed out against medical advice prior to its completion. You should arrange for MRI with hematologist or PCP as outpt as soon as possible.  Pt left ama without paperwork.      SECONDARY DISCHARGE DIAGNOSES  Diagnosis: Elevated troponin  Assessment and Plan of Treatment: You were seen by cardiologist and recommended for echocardiogram. You refused to remain inpatient for echocardiogram and left against medical advice. Follow up with cardiologist as outpt for further management and echocardiogram as outpt.   Pt left ama without paperwork.

## 2025-01-15 NOTE — CONSULT NOTE ADULT - SUBJECTIVE AND OBJECTIVE BOX
CHIEF COMPLAINT: B/L LE edema    HISTORY OF PRESENT ILLNESS:  Patient is a 34 year old male with PMHx bipolar disorder, active cigarette smoke use, history of drug use (reports currently on Suboxone taper, states most recent substance use was fentanyl combination 12/22/24) who presents overnight for ongoing c/f B/L LE edema. Cardiology consulted for concern for troponinemia. On interview, patient reports chest pain free and denies SOB, palpitations, N/V/D/C. Patient reports only complaint is worsening LE edema, of which he states began ~3 weeks ago. He states he recently went to outside hospital for evaluation of which he was prescribed 7 days of PO Lasix 20 mg and says he finished prescription 3 days prior to ED arrival. Patient reports no prior episodes like this. Denies history of arrhythmia, CAD, HF. Denies history of hypercoagulability, bleeding disorders, history of DVT/PE. Denies fevers, chills, myalgias.    HOSPITAL COURSE:  Triage vital signs/vital signs while in ED: HR 80s-low 100s, SBP 130s-140s, satting >95% on room air  Medications/tx received while in ED: s/p  mg x1; Heparin gtt  EKG: NSR, nonischemic  Pertinent labs: WBC 11.29, H/H 11.2/35.2, troponin 335->294, , CKMB 60.7 CPK 14.1 proBNP 172, mildly elevated ALT to 45 remainder of LFTs wnl  Imaging: B/L Duplex positive for acute R gastroc DVT and nonocclusive L gastroc vein, CTA PE negative for PE however demonstrating punctate RLL calcified granuloma; CXR with clear lungs      Allergies  No Known Allergies    Intolerances    	    MEDICATIONS:  heparin   Injectable 6000 Unit(s) IV Push every 6 hours PRN  heparin   Injectable 3000 Unit(s) IV Push every 6 hours PRN  heparin  Infusion.  Unit(s)/Hr IV Continuous <Continuous>                  PAST MEDICAL & SURGICAL HISTORY:  Anxiety      Depression      Bipolar 1 disorder      No significant past surgical history          FAMILY HISTORY:      SOCIAL HISTORY:    [ ] Non-smoker  [ x ] Smoker  [ ] Alcohol  [ x ] Denies Alcohol      REVIEW OF SYSTEMS:  CONSTITUTIONAL: no fevers or chills  EYES/ENT: No visual changes; No vertigo or throat pain  NECK: No JVD  RESPIRATORY:  No cough, wheezing, chills or hemoptysis, SOB  CARDIOVASCULAR: +B/L LE edema. No chest pain, palpitations, passing out, dizziness, or leg swelling  GASTROINTESTINAL: No abdominal or epigastric pain. No nausea/vomiting/melena  Genitourinary: No dysuria, frequency or hematuria  NEUROLOGICAL: No numbness or weakness  SKIN: No itching, burning, rashes or lesions      PHYSICAL EXAM:  T(C): 36.7 (01-15-25 @ 01:44), Max: 36.8 (01-14-25 @ 19:20)  HR: 89 (01-15-25 @ 01:44) (89 - 106)  BP: 138/87 (01-15-25 @ 01:44) (138/87 - 142/88)  RR: 18 (01-15-25 @ 01:44) (18 - 18)  SpO2: 99% (01-15-25 @ 01:44) (99% - 99%)  Wt(kg): --  ICU Vital Signs Last 24 Hrs  T(C): 36.7 (15 Dmitri 2025 01:44), Max: 36.8 (14 Jan 2025 19:20)  T(F): 98 (15 Dmitri 2025 01:44), Max: 98.2 (14 Jan 2025 19:20)  HR: 89 (15 Dmitri 2025 01:44) (89 - 106)  BP: 138/87 (15 Dmitri 2025 01:44) (138/87 - 142/88)  BP(mean): --  ABP: --  ABP(mean): --  RR: 18 (15 Dmitri 2025 01:44) (18 - 18)  SpO2: 99% (15 Dmitri 2025 01:44) (99% - 99%)    O2 Parameters below as of 15 Dmitri 2025 01:44  Patient On (Oxygen Delivery Method): room air          I&O's Summary      PHYSICAL EXAM:  Appearance: Appears disheveled; NAD	  HEENT:   Normal oral mucosa	  Cardiovascular: Normal S1 S2, No JVD, No murmurs, No edema  Respiratory: Lungs clear to auscultation	  Psychiatry: A & O x 3, Mood & affect appropriate  Gastrointestinal:  Soft, Non-tender, + BS	  Skin: No rashes, No ecchymoses, No cyanosis	  Neurologic: Non-focal  Extremities: Pain w/palpation to LE B/L. 1+ pitting edema B/L. Warm and well perfused. 2+ pulses bilaterally        LABS:	 	    CBC Full  -  ( 15 Dmitri 2025 00:38 )  WBC Count : 11.29 K/uL  Hemoglobin : 11.2 g/dL  Hematocrit : 35.2 %  Platelet Count - Automated : 726 K/uL  Mean Cell Volume : 85.2 fL  Mean Cell Hemoglobin : 27.1 pg  Mean Cell Hemoglobin Concentration : 31.8 g/dL  Auto Neutrophil # : 7.80 K/uL  Auto Lymphocyte # : 2.54 K/uL  Auto Monocyte # : 0.72 K/uL  Auto Eosinophil # : 0.12 K/uL  Auto Basophil # : 0.07 K/uL  Auto Neutrophil % : 69.0 %  Auto Lymphocyte % : 22.5 %  Auto Monocyte % : 6.4 %  Auto Eosinophil % : 1.1 %  Auto Basophil % : 0.6 %    01-15    137  |  101  |  11  ----------------------------<  88  3.7   |  22  |  0.69    Ca    9.0      15 Dmitri 2025 00:38    TPro  7.6  /  Alb  4.0  /  TBili  0.2  /  DBili  x   /  AST  31  /  ALT  45[H]  /  AlkPhos  116  01-15      proBNP: 172  Lipid Profile:   HgA1c:   TSH:     CARDIAC MARKERS:  Troponin 335->294    CKMB 60.7  CPK 14.1              PREVIOUS DIAGNOSTIC TESTING:    [ ] Echocardiogram:  [ ]  Catheterization:  [ ] Stress Test:

## 2025-01-15 NOTE — ED ADULT NURSE NOTE - NSFALLUNIVINTERV_ED_ALL_ED
Bed/Stretcher in lowest position, wheels locked, appropriate side rails in place/Call bell, personal items and telephone in reach/Instruct patient to call for assistance before getting out of bed/chair/stretcher/Non-slip footwear applied when patient is off stretcher/[place] to call system/Physically safe environment - no spills, clutter or unnecessary equipment/Purposeful proactive rounding/Room/bathroom lighting operational, light cord in reach

## 2025-01-15 NOTE — CONSULT NOTE ADULT - NS ATTEND AMEND GEN_ALL_CORE FT
pt with DVT and reports sx progressive since last time he used fentanyl and xylazine  ECG with almost hyperacute T waves in lat precordial leads  c/f myocarditis as this can be associated with xylazine  pt with nl cardiac exam  recommend TTE to further evaluate, however pt reports he does not want to stay

## 2025-01-15 NOTE — DISCHARGE NOTE PROVIDER - CARE PROVIDERS DIRECT ADDRESSES
,DirectAddress_Unknown ,DirectAddress_Unknown,[email or web address]

## 2025-01-15 NOTE — ED ADULT NURSE NOTE - OBJECTIVE STATEMENT
34 y male with past medical history of seizures c/o bilateral feet and ankles swelling since december, Pt stated that he also had pain with ambulation. lower extremity swelling noted right more then left. 20g IV placed, labs obtained and sent to the lab

## 2025-01-15 NOTE — CHART NOTE - NSCHARTNOTEFT_GEN_A_CORE
Upon initial presentation, patient reports that he has signed out against medical advice  Refusing physical examination  Allowed me to speak to his sister - Deborah - discussed findings from ED visit    Patient and sister aware of DVT in LLE  Will prescribe eliquis starter pack for pain  The patient has decided to leave against medical advice and is alert and oriented x3, not intoxicated, and has capability to make medical decisions.    We discussed all risks, benefits, and alternatives to the progression of treatment and the potential dangers of leaving including but not limited to permanent disability, injury, and death.    The patient was instructed that they are welcome to change their decision to leave against medical advice and return to the emergency department at any time and for any reason in order to allow us to render care.

## 2025-01-15 NOTE — DISCHARGE NOTE PROVIDER - NSDCFUADDAPPT_GEN_ALL_CORE_FT
APPTS ARE READY TO BE MADE: [X] YES    Best Family or Patient Contact (if needed):  (Cell) [phone]) Pt Cell     Additional Information about above appointments (if needed):    1: Hematologist at [facility] for DVT and muscle enlargement seen on US  2:   3:     Other comments or requests:

## 2025-01-15 NOTE — DISCHARGE NOTE PROVIDER - NSDCMRMEDTOKEN_GEN_ALL_CORE_FT
Eliquis Starter Pack for Treatment of DVT and PE 5 mg oral tablet: 1 tab(s) orally 2 times a day Take 2 tablets twice a day x 7 days then take 1 tablet twice a day

## 2025-01-15 NOTE — ED ADULT NURSE REASSESSMENT NOTE - NS ED NURSE REASSESS COMMENT FT1
difficulty initially scanning bag of heparin. infusion initiated at 0930 am at 1300units/hr. repeat APTT due @1530. provider jorge OLGUIN aware. and will order a scheduled repeat APTT at 1530. autogenerated APTT for 1630 to be disregarded.

## 2025-01-15 NOTE — DISCHARGE NOTE PROVIDER - HOSPITAL COURSE
34y Male who has a past medical and surgery history of Anxiety Depression Bipolar 1 disorder PTED with LE swelling and pain to his feet relates alot of walking of note footwear =crocs Swelling however goes up to bottom of knees and skin of LE is darker He has been seen at [facility] on multple occasions including a 2 day admission ; had negative w/u and was sent out on " water pills"  + nancy lower leg swelling. Denies chest pain . Denies shortness of breath PND chest pain of palpitations.    **INCOMP  34y Male who has a past medical and surgery history of Anxiety Depression Bipolar 1 disorder PTED with LE swelling and pain to his feet relates alot of walking of note footwear =crocs Swelling however goes up to bottom of knees and skin of LE is darker.  He has been seen at [facility] on multiple occasions including a 2 day admission; had negative w/u and was sent out on " water pills"  + bilateral lower leg swelling. Denies chest pain. Denies shortness of breath PND chest pain of palpitations.    Pt was admitted to telemetry in setting of chest pain with **  34y M with hx of Anxiety Depression Bipolar 1 disorder PTED with LE swelling and pain to his feet relates alot of walking of note footwear = crocs Swelling however goes up to bottom of knees and skin of LE is darker.  He has been seen at [facility] on multiple occasions including a 2 day admission; had negative w/u and was sent out on " water pills"  + bilateral lower leg swelling. Denies chest pain. Denies shortness of breath PND chest pain of palpitations.    Pt was admitted to telemetry in setting of chest pain with elevated troponin 335-->294. Pt was ordered for echocardiogram and was seen by cardiology at bedside, however, consult pending. Pt with LE edema, underwent LE duplex which revealed acute deep venous thrombosis: below the knee. Nonocclusive thrombus in the left gastrocnemius vein. Heterogeneous enlarged appearance of what appears to be left distal thigh musculature. Cannot exclude mass. Recommend further evaluation with MRI with intravenous contrast. Results discussed with Dr. Krueger and MRI LE was recommended, however Pt declined to stay for MRI.     Called by nurse to evaluate patient who wishes to leave the hospital against medical advice at the same time he was called for echo, but unwilling to stay. Patient is A&O x3 and has full capacity to make his or her own medical decisions, confirmed with Dr. Pulido. Pt was informed of their medical conditions, benefits, and alternatives to treatment as well as the risks of refusing treatment and the seriousness of leaving against medical advice such as the risks of heart attack, stroke, seizure, and even death were fully explained to the patient. After expressing full understanding, patient then signs out against medical advice witnessed by the nurse.  Dr. Pulido was at bedside during this period of time, aware. Pt sent Rx for Eliquis starter pack to his pharmacy of choice and token placed for outpt hematology appt with Pts phone number provided to arrange. I explained all follow up to patient with repeat back, but he left prior to receiving his discharge paper work.    34y M with hx of Anxiety Depression Bipolar 1 disorder PTED with LE swelling and pain to his feet relates alot of walking of note footwear = crocs Swelling however goes up to bottom of knees and skin of LE is darker.  He has been seen at [facility] on multiple occasions including a 2 day admission; had negative w/u and was sent out on " water pills"  + bilateral lower leg swelling. Denies chest pain. Denies shortness of breath PND chest pain of palpitations.    Pt was admitted to telemetry in setting of chest pain with elevated troponin 335-->294. Pt was ordered for echocardiogram and was seen by cardiology at bedside, however, consult pending at the time of ama. Cardiology: DVT and reports sx progressive since last time he used fentanyl and xylazine. ECG with almost hyperacute T waves in lat precordial leads. c/f myocarditis as this can be associated with xylazine. Pt with nl cardiac exam. Recommend TTE to further evaluate, however, pt reports he does not want to stay. Pt with LE edema, underwent LE duplex which revealed acute deep venous thrombosis: below the knee. Nonocclusive thrombus in the left gastrocnemius vein. Heterogeneous enlarged appearance of what appears to be left distal thigh musculature. Cannot exclude mass. Recommend further evaluation with MRI with intravenous contrast. Results discussed with Dr. Krueger and MRI LE was recommended, however Pt declined to stay for MRI. CTA Chest: No pulmonary embolism. No consolidation or pleural effusion. Indeterminate mild compression deformity of T4 vertebral body.    Called by nurse to evaluate patient who wishes to leave the hospital against medical advice at the same time he was called for echo, but unwilling to stay. Patient is A&O x3 and has full capacity to make his or her own medical decisions, confirmed with Dr. Pulido. Pt was informed of their medical conditions, benefits, and alternatives to treatment as well as the risks of refusing treatment and the seriousness of leaving against medical advice such as the risks of heart attack, stroke, seizure, and even death were fully explained to the patient. After expressing full understanding, patient then signs out against medical advice witnessed by the nurse.  Dr. Pulido was at bedside during this period of time, aware. Pt sent Rx for Eliquis starter pack to his pharmacy of choice and token placed for outpt hematology appt with Pts phone number provided to arrange. I explained all follow up to patient with repeat back, but he left prior to receiving his discharge paper work.

## 2025-01-15 NOTE — DISCHARGE NOTE PROVIDER - NSFOLLOWUPCLINICS_GEN_ALL_ED_FT
[facility] [place]  Internal Medicine  [address] [place] [address]  Phone: [phone]  Fax: [phone]    [facility] [place]  Hematology/Oncology  [address] [place] [id number]  Phone: [phone]  Fax:

## 2025-01-15 NOTE — DISCHARGE NOTE PROVIDER - PROVIDER TOKENS
FREE:[LAST:[Hematologist],PHONE:[(   )    -],FAX:[(   )    -],ADDRESS:[within 3-5 days]] FREE:[LAST:[Hematologist],PHONE:[(   )    -],FAX:[(   )    -],ADDRESS:[within 3-5 days]],PROVIDER:[TOKEN:[[id number]]]

## 2025-01-15 NOTE — ED ADULT NURSE REASSESSMENT NOTE - NS ED NURSE REASSESS COMMENT FT1
report received from overnight RN . A&Ox4. NAD. pt denies SOB, chest pain, dizziness, weakness, urinary symptoms, HA, n/v/d, fevers, chills, pain, alcohol abuse, drug abuse, SI, HI. respirations are even and un labored. skin intact. 20g and 22g IV is patent. heparin found to be not connected. provider aware. safety precautions maintained.

## 2025-01-15 NOTE — ED ADULT NURSE NOTE - CHIEF COMPLAINT QUOTE
Pt st " Both  by feet  and lower legs are swelling and they hurt since December.I was seen at [facility] Jan 4 they said I am retaining water but didn't find out why. I was admitted for 2 days. I came for a second opinion. They told me to take waterpills for one week and follow up with primary Md. I don't have primary....just came over here.  " + nancy lower leg swelling. Denies chest pain . Denies shortness of breath.  Denies med hx.

## 2025-01-15 NOTE — ED ADULT NURSE REASSESSMENT NOTE - NS ED NURSE REASSESS COMMENT FT1
PT refused echo. provider at bedside and aware. PT has tremors and states he is withdrawing. PT refuses to tell RN to what the he is withdrawing from. provider jorge at bedside and aware. PT refused echo. provider at bedside and aware. PT educated on risks of signing out AMA and death. encouraged to stay and receive treatment. PT has tremors and states he is withdrawing. PT refuses to tell RN to what the he is withdrawing from. provider jorge at bedside and aware.

## 2025-01-15 NOTE — CONSULT NOTE ADULT - ASSESSMENT
34M with PMHx bipolar disorder, active cigarette smoke use, history of drug use (reports currently on Suboxone taper, states most recent substance use was fentanyl combination 12/22/24) who presents overnight for ongoing c/f B/L LE edema. Cardiology consulted for concern for troponinemia. On interview, patient reports chest pain free and denies SOB, palpitations, N/V/D/C. Patient reports only complaint is worsening LE edema, of which he states began ~3 weeks ago. He states he recently went to outside hospital for evaluation of which he was prescribed 7 days of PO Lasix 20 mg and says he finished prescription 3 days prior to ED arrival. Patient reports no prior episodes like this. Denies history of arrhythmia, CAD, HF. Denies history of hypercoagulability, bleeding disorders, history of DVT/PE. Denies fevers, chills, myalgias. EKG nonischemic with troponin leak, suspect type 2 NSTEMI of unclear etiology at this time, low suspicion for type 1 as patient CP free.      #NSTEMI  - Continue heparin gtt for now for RLE DVT  - Would hold DAPT at this time  - Continue to trend troponin, CK, CPK, CK-MB to peak  - Serial EKGs PRN for chest pain  - Recommend admission to telemetry for further monitoring  - F/U TTE in AM for further assessment of LV function and r/o WMA  - F/U risk factor labs A1C, Lipid profile, TSH  - Pending further d/w attending regarding obtaining CT coronaries in setting of troponinemia  - Please follow up serum/urine toxicology as pt with history of substance use  - Consideration of w/u per primary team of RLL calcified granuloma detected on CTA   - Symptomatic management and ongoing w/u per primary medicine team        Case discussed with cardiology fellow Dr. Harrison  Should patient HD status change, please call cardiology at #[phone] once again.  Attending attestation to follow for final recommendations

## 2025-01-16 ENCOUNTER — INPATIENT (INPATIENT)
Facility: HOSPITAL | Age: 35
LOS: 4 days | Discharge: HOME | End: 2025-01-21
Attending: STUDENT IN AN ORGANIZED HEALTH CARE EDUCATION/TRAINING PROGRAM | Admitting: STUDENT IN AN ORGANIZED HEALTH CARE EDUCATION/TRAINING PROGRAM
Payer: MEDICAID

## 2025-01-16 VITALS
SYSTOLIC BLOOD PRESSURE: 162 MMHG | WEIGHT: 156.75 LBS | DIASTOLIC BLOOD PRESSURE: 90 MMHG | HEART RATE: 122 BPM | RESPIRATION RATE: 20 BRPM | OXYGEN SATURATION: 99 % | TEMPERATURE: 98 F

## 2025-01-16 DIAGNOSIS — I82.409 ACUTE EMBOLISM AND THROMBOSIS OF UNSPECIFIED DEEP VEINS OF UNSPECIFIED LOWER EXTREMITY: ICD-10-CM

## 2025-01-16 DIAGNOSIS — I82.402 ACUTE EMBOLISM AND THROMBOSIS OF UNSPECIFIED DEEP VEINS OF LEFT LOWER EXTREMITY: ICD-10-CM

## 2025-01-16 DIAGNOSIS — F19.10 OTHER PSYCHOACTIVE SUBSTANCE ABUSE, UNCOMPLICATED: ICD-10-CM

## 2025-01-16 DIAGNOSIS — R79.89 OTHER SPECIFIED ABNORMAL FINDINGS OF BLOOD CHEMISTRY: ICD-10-CM

## 2025-01-16 DIAGNOSIS — D64.9 ANEMIA, UNSPECIFIED: ICD-10-CM

## 2025-01-16 LAB
A1C WITH ESTIMATED AVERAGE GLUCOSE RESULT: 4.8 % — SIGNIFICANT CHANGE UP (ref 4–5.6)
ALBUMIN SERPL ELPH-MCNC: 3.7 G/DL — SIGNIFICANT CHANGE UP (ref 3.3–5)
ALP SERPL-CCNC: 107 U/L — SIGNIFICANT CHANGE UP (ref 40–120)
ALT FLD-CCNC: 29 U/L — SIGNIFICANT CHANGE UP (ref 4–41)
AMPHET UR-MCNC: NEGATIVE — SIGNIFICANT CHANGE UP
ANION GAP SERPL CALC-SCNC: 11 MMOL/L — SIGNIFICANT CHANGE UP (ref 7–14)
APAP SERPL-MCNC: <10 UG/ML — LOW (ref 15–25)
APTT BLD: 27.3 SEC — SIGNIFICANT CHANGE UP (ref 24.5–35.6)
AST SERPL-CCNC: 25 U/L — SIGNIFICANT CHANGE UP (ref 4–40)
BARBITURATES UR SCN-MCNC: NEGATIVE — SIGNIFICANT CHANGE UP
BASOPHILS # BLD AUTO: 0.07 K/UL — SIGNIFICANT CHANGE UP (ref 0–0.2)
BASOPHILS NFR BLD AUTO: 0.6 % — SIGNIFICANT CHANGE UP (ref 0–2)
BENZODIAZ UR-MCNC: NEGATIVE — SIGNIFICANT CHANGE UP
BILIRUB SERPL-MCNC: <0.2 MG/DL — SIGNIFICANT CHANGE UP (ref 0.2–1.2)
BLD GP AB SCN SERPL QL: NEGATIVE — SIGNIFICANT CHANGE UP
BUN SERPL-MCNC: 10 MG/DL — SIGNIFICANT CHANGE UP (ref 7–23)
CALCIUM SERPL-MCNC: 8.7 MG/DL — SIGNIFICANT CHANGE UP (ref 8.4–10.5)
CHLORIDE SERPL-SCNC: 101 MMOL/L — SIGNIFICANT CHANGE UP (ref 98–107)
CHOLEST SERPL-MCNC: 164 MG/DL — SIGNIFICANT CHANGE UP
CK MB BLD-MCNC: 12.1 % — HIGH (ref 0–2.5)
CK MB CFR SERPL CALC: 46 NG/ML — HIGH
CK SERPL-CCNC: 380 U/L — HIGH (ref 30–200)
CO2 SERPL-SCNC: 26 MMOL/L — SIGNIFICANT CHANGE UP (ref 22–31)
COCAINE METAB.OTHER UR-MCNC: NEGATIVE — SIGNIFICANT CHANGE UP
CREAT SERPL-MCNC: 0.75 MG/DL — SIGNIFICANT CHANGE UP (ref 0.5–1.3)
CREATININE URINE RESULT, DAU: 161 MG/DL — SIGNIFICANT CHANGE UP
EGFR: 120 ML/MIN/1.73M2 — SIGNIFICANT CHANGE UP
EOSINOPHIL # BLD AUTO: 0.08 K/UL — SIGNIFICANT CHANGE UP (ref 0–0.5)
EOSINOPHIL NFR BLD AUTO: 0.7 % — SIGNIFICANT CHANGE UP (ref 0–6)
ESTIMATED AVERAGE GLUCOSE: 91 — SIGNIFICANT CHANGE UP
ETHANOL SERPL-MCNC: <10 MG/DL — SIGNIFICANT CHANGE UP
FENTANYL UR QL SCN: POSITIVE
GLUCOSE SERPL-MCNC: 94 MG/DL — SIGNIFICANT CHANGE UP (ref 70–99)
HCT VFR BLD CALC: 32.8 % — LOW (ref 39–50)
HDLC SERPL-MCNC: 62 MG/DL — SIGNIFICANT CHANGE UP
HGB BLD-MCNC: 10.2 G/DL — LOW (ref 13–17)
IANC: 7.97 K/UL — HIGH (ref 1.8–7.4)
IMM GRANULOCYTES NFR BLD AUTO: 0.3 % — SIGNIFICANT CHANGE UP (ref 0–0.9)
INR BLD: 1.08 RATIO — SIGNIFICANT CHANGE UP (ref 0.85–1.16)
LIPID PNL WITH DIRECT LDL SERPL: 91 MG/DL — SIGNIFICANT CHANGE UP
LYMPHOCYTES # BLD AUTO: 19.2 % — SIGNIFICANT CHANGE UP (ref 13–44)
LYMPHOCYTES # BLD AUTO: 2.13 K/UL — SIGNIFICANT CHANGE UP (ref 1–3.3)
MCHC RBC-ENTMCNC: 26.7 PG — LOW (ref 27–34)
MCHC RBC-ENTMCNC: 31.1 G/DL — LOW (ref 32–36)
MCV RBC AUTO: 85.9 FL — SIGNIFICANT CHANGE UP (ref 80–100)
METHADONE UR-MCNC: NEGATIVE — SIGNIFICANT CHANGE UP
MONOCYTES # BLD AUTO: 0.82 K/UL — SIGNIFICANT CHANGE UP (ref 0–0.9)
MONOCYTES NFR BLD AUTO: 7.4 % — SIGNIFICANT CHANGE UP (ref 2–14)
NEUTROPHILS # BLD AUTO: 7.97 K/UL — HIGH (ref 1.8–7.4)
NEUTROPHILS NFR BLD AUTO: 71.8 % — SIGNIFICANT CHANGE UP (ref 43–77)
NON HDL CHOLESTEROL: 102 MG/DL — SIGNIFICANT CHANGE UP
NRBC # BLD AUTO: 0 K/UL — SIGNIFICANT CHANGE UP (ref 0–0)
NRBC # BLD: 0 /100 WBCS — SIGNIFICANT CHANGE UP (ref 0–0)
NRBC # FLD: 0 K/UL — SIGNIFICANT CHANGE UP (ref 0–0)
NRBC BLD-RTO: 0 /100 WBCS — SIGNIFICANT CHANGE UP (ref 0–0)
NT-PROBNP SERPL-SCNC: 145 PG/ML — SIGNIFICANT CHANGE UP
OPIATES UR-MCNC: NEGATIVE — SIGNIFICANT CHANGE UP
OXYCODONE UR-MCNC: NEGATIVE — SIGNIFICANT CHANGE UP
PCP SPEC-MCNC: SIGNIFICANT CHANGE UP
PCP UR-MCNC: NEGATIVE — SIGNIFICANT CHANGE UP
PLATELET # BLD AUTO: 566 K/UL — HIGH (ref 150–400)
POTASSIUM SERPL-MCNC: 3.6 MMOL/L — SIGNIFICANT CHANGE UP (ref 3.5–5.3)
POTASSIUM SERPL-SCNC: 3.6 MMOL/L — SIGNIFICANT CHANGE UP (ref 3.5–5.3)
PROT SERPL-MCNC: 7.1 G/DL — SIGNIFICANT CHANGE UP (ref 6–8.3)
PROTHROM AB SERPL-ACNC: 12.8 SEC — SIGNIFICANT CHANGE UP (ref 9.9–13.4)
RBC # BLD: 3.82 M/UL — LOW (ref 4.2–5.8)
RBC # FLD: 16.8 % — HIGH (ref 10.3–14.5)
RH IG SCN BLD-IMP: POSITIVE — SIGNIFICANT CHANGE UP
SALICYLATES SERPL-MCNC: <0.3 MG/DL — LOW (ref 15–30)
SODIUM SERPL-SCNC: 138 MMOL/L — SIGNIFICANT CHANGE UP (ref 135–145)
THC UR QL: POSITIVE
TOXICOLOGY SCREEN, DRUGS OF ABUSE, SERUM RESULT: SIGNIFICANT CHANGE UP
TRIGL SERPL-MCNC: 58 MG/DL — SIGNIFICANT CHANGE UP
TROPONIN T, HIGH SENSITIVITY RESULT: 242 NG/L — CRITICAL HIGH
TSH SERPL-MCNC: 1.22 UIU/ML — SIGNIFICANT CHANGE UP (ref 0.27–4.2)
WBC # BLD: 11.1 K/UL — HIGH (ref 3.8–10.5)
WBC # FLD AUTO: 11.1 K/UL — HIGH (ref 3.8–10.5)

## 2025-01-16 PROCEDURE — 99223 1ST HOSP IP/OBS HIGH 75: CPT

## 2025-01-16 PROCEDURE — 99285 EMERGENCY DEPT VISIT HI MDM: CPT

## 2025-01-16 RX ORDER — HEPARIN SODIUM,PORCINE 10000/ML
6000 VIAL (ML) INJECTION ONCE
Refills: 0 | Status: COMPLETED | OUTPATIENT
Start: 2025-01-16 | End: 2025-01-16

## 2025-01-16 RX ORDER — HEPARIN SODIUM,PORCINE 10000/ML
VIAL (ML) INJECTION
Qty: 25000 | Refills: 0 | Status: DISCONTINUED | OUTPATIENT
Start: 2025-01-16 | End: 2025-01-17

## 2025-01-16 RX ORDER — HEPARIN SODIUM,PORCINE 10000/ML
3000 VIAL (ML) INJECTION EVERY 6 HOURS
Refills: 0 | Status: DISCONTINUED | OUTPATIENT
Start: 2025-01-16 | End: 2025-01-17

## 2025-01-16 RX ORDER — HEPARIN SODIUM,PORCINE 10000/ML
6000 VIAL (ML) INJECTION EVERY 6 HOURS
Refills: 0 | Status: DISCONTINUED | OUTPATIENT
Start: 2025-01-16 | End: 2025-01-17

## 2025-01-16 RX ORDER — BUPRENORPHINE AND NALOXONE 8; 2 MG/1; MG/1
1 FILM BUCCAL; SUBLINGUAL ONCE
Refills: 0 | Status: DISCONTINUED | OUTPATIENT
Start: 2025-01-16 | End: 2025-01-16

## 2025-01-16 RX ADMIN — Medication 6000 UNIT(S): at 19:06

## 2025-01-16 RX ADMIN — BUPRENORPHINE AND NALOXONE 1 TABLET(S): 8; 2 FILM BUCCAL; SUBLINGUAL at 19:08

## 2025-01-16 RX ADMIN — Medication 1300 UNIT(S)/HR: at 19:07

## 2025-01-16 NOTE — ED PROVIDER NOTE - ATTENDING CONTRIBUTION TO CARE
Halie Mehta MD attending physician.  Patient comes in with known DVTs both lower extremities.  He left AMA less than 24 hours ago.  Patient was upset that his drug use was discussed in a very public area and asked that that not be done again.  Of note patient has been using in the past fentanyl plus xylazine.  He said he last used on December 27 but has been using Suboxone and methadone in an alternating manner since then not supported by any particular program but getting from the street.  Of note he also had elevated troponin and they were concerned he had an NSTEMI versus myocarditis.  He had hyperacute T waves previously was supposed get a TTE and an MRI of his lower extremities.  Has no PE.    He was on heparin initially but has not had it since he left.  Has been off anticoagulation over 24 hours.  Denies chest pain shortness of breath.    Here patient with vital signs 157/92 respiratory rate 18 heart rate 101 temp 98.2 O2 sat is 98.    Pt alert and can phonate well is cooperative with discussion and understands the implications of both DVT and being admitted  h at/nc  perrl, conj clear, sclera anicteric,  neck supple  neueo awake, lucid normal gait moves all extremities with strength  psych normal affect  vs reasonable  Rest of exam as documented by resident    Plan is to admit the patient would also start him on likely Suboxone while he is here.  Toxicology will be involved to offer the recommendation    I performed a history and physical exam of the patient and discussed their management with the resident and /or advanced care provider. I personally made/approved the management plan and take responsibility for the patient management. I reviewed the resident and /or ACP's note and agree with the documented findings and plan of care. My medical decison making and observations are found above.

## 2025-01-16 NOTE — H&P ADULT - PROBLEM SELECTOR PLAN 6
Hgb = 10.2, MCV = 85.9  Has frequently recurring epistaxis (dark blood, and bright red blood) for the past month, at least  Anemia, however, likely of multifactorial etiology  - f/u anemia profile/iron studies and reticulocyte count in the AM  - f/u hemoglobin trend

## 2025-01-16 NOTE — H&P ADULT - PROBLEM SELECTOR PLAN 1
Presented with pain and swelling of bilateral lower extremities, with L > R  US of 01/15/2025 with finding of "Acute deep venous thrombosis: below the knee.  Nonocclusive thrombus in the left gastrocnemius vein..."  Had left AMA on the prior day and now returns for treatment  - heparin drip started; will continue  - f/u lab-work  - transition to oral AC as soon as appropriate  - analgesic PRN

## 2025-01-16 NOTE — ED ADULT TRIAGE NOTE - CHIEF COMPLAINT QUOTE
Patient AMA'd yesterday after being diagnosed with b/l DVT, now endorsing b/l leg pain and numbness. Patient reports numbness to feet started two days ago, denies any CP but admits to SOB. Patient able to speak in clear sentences, respirations equal and unlabored on room air. pmhx: seizure (not complaint with meds, last seizure on September 9, 2024), DVT
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
## 2025-01-16 NOTE — H&P ADULT - PROBLEM SELECTOR PLAN 7
Recurrent, for the past month, at least  Large globs of dark blood, with intermittent bright blood extruded from nose after blowing  Patient states does not take any drug nasally  - on heparin drip for DVT  - prescribing saline nasal spray = 2 sprays QID PRN  - evaluate for recurrence after using sprays  - may benefit from ENT consult

## 2025-01-16 NOTE — ED ADULT NURSE NOTE - NSFALLOOBATTEMPT_ED_ALL_ED
Overnight, patient seemed most comfortable when receiving PRN IV dilaudid q4hr. Patient extremely sweaty, and clammy; would shaking aggressively when feeling cold and in pain. Pt's leads changed multiple times. Right AC PIV dressing reinforced and changed multiple times. 2 PIVs insertions attempted without success. Due to this, IV fluids D/C and pt encouraged to drink a lot of water, which he did. Cardiac monitor also D/C due to excessive sweating and agitating the patient. Pt insisted on not receiving any narcotics or benzos, only PO tylenol and PO ibuprofen. After being very restless all night and wanting to leave AMA, patient did ask for something stronger to help him sleep and control the pain. PRN dilaudid given at 0446. Mother at bedside and updated throughout shift. Pt also with continual 1:1.      Problem: PAIN - ADULT  Goal: Verbalizes/displays adequate comfort level or baseline comfort level  Description: Interventions:  - Encourage patient to monitor pain and request assistance  - Assess pain using appropriate pain scale0-10  - Administer analgesics based on type and severity of pain and evaluate response  - Implement non-pharmacological measures as appropriate and evaluate response  - Consider cultural and social influences on pain and pain management  - Notify physician/advanced practitioner if interventions unsuccessful or patient reports new pain  6/10/2024 0548 by Randi Reyez RN  Outcome: Progressing  6/10/2024 0548 by Randi Reyez RN  Outcome: Progressing     Problem: INFECTION - ADULT  Goal: Absence or prevention of progression during hospitalization  Description: INTERVENTIONS:  - Assess and monitor for signs and symptoms of infection  - Monitor lab/diagnostic results  - Monitor all insertion sites, i.e. indwelling lines, tubes, and drains  - Monitor endotracheal if appropriate and nasal secretions for changes in amount and color  - [place] appropriate cooling/warming therapies per order  -  Administer medications as ordered  - Instruct and encourage patient and family to use good hand hygiene technique  - Identify and instruct in appropriate isolation precautions for identified infection/condition  6/10/2024 0548 by Randi Reyez RN  Outcome: Progressing  6/10/2024 0548 by Randi Reyez RN  Outcome: Progressing  Goal: Absence of fever/infection during neutropenic period  Description: INTERVENTIONS:  - Monitor WBC    Outcome: Progressing     Problem: SAFETY ADULT  Goal: Patient will remain free of falls  Description: INTERVENTIONS:  - Educate patient/family on patient safety including physical limitations  - Instruct patient to call for assistance with activity   - Consult OT/PT to assist with strengthening/mobility   - Keep Call bell within reach  - Keep bed low and locked with side rails adjusted as appropriate  - Keep care items and personal belongings within reach  - Initiate and maintain comfort rounds  - Make Fall Risk Sign visible to staff  - Offer Toileting every 2 Hours, in advance of need  - Initiate/Maintain bed alarm  - Apply yellow socks and bracelet for high fall risk patients  - Consider moving patient to room near nurses station  Outcome: Progressing  Goal: Maintain or return to baseline ADL function  Description: INTERVENTIONS:  -  Assess patient's ability to carry out ADLs; assess patient's baseline for ADL function and identify physical deficits which impact ability to perform ADLs (bathing, care of mouth/teeth, toileting, grooming, dressing, etc.)  - Assess/evaluate cause of self-care deficits   - Assess range of motion  - Assess patient's mobility; develop plan if impaired  - Assess patient's need for assistive devices and provide as appropriate  - Encourage maximum independence but intervene and supervise when necessary  - Involve family in performance of ADLs  - Assess for home care needs following discharge   - Consider OT consult to assist with ADL evaluation and planning for  discharge  - Provide patient education as appropriate  Outcome: Progressing  Goal: Maintains/Returns to pre admission functional level  Description: INTERVENTIONS:  - Perform AM-PAC 6 Click Basic Mobility/ Daily Activity assessment daily.  - Set and communicate daily mobility goal to care team and patient/family/caregiver.   - Collaborate with rehabilitation services on mobility goals if consulted  - Stand patient  - Ambulate patient  - Out of bed to chair  - Out of bed for meals  - Out of bed for toileting  - Record patient progress and toleration of activity level   Outcome: Progressing     Problem: DISCHARGE PLANNING  Goal: Discharge to home or other facility with appropriate resources  Description: INTERVENTIONS:  - Identify barriers to discharge w/patient and caregiver  - Arrange for needed discharge resources and transportation as appropriate  - Identify discharge learning needs (meds, wound care, etc.)  - Arrange for interpretive services to assist at discharge as needed  - Refer to Case Management Department for coordinating discharge planning if the patient needs post-hospital services based on physician/advanced practitioner order or complex needs related to functional status, cognitive ability, or social support system  Outcome: Progressing     Problem: Knowledge Deficit  Goal: Patient/family/caregiver demonstrates understanding of disease process, treatment plan, medications, and discharge instructions  Description: Complete learning assessment and assess knowledge base.  Interventions:  - Provide teaching at level of understanding  - Provide teaching via preferred learning methods  Outcome: Progressing     Problem: Nutrition/Hydration-ADULT  Goal: Nutrient/Hydration intake appropriate for improving, restoring or maintaining nutritional needs  Description: Monitor and assess patient's nutrition/hydration status for malnutrition. Collaborate with interdisciplinary team and initiate plan and interventions  as ordered.  Monitor patient's weight and dietary intake as ordered or per policy. Utilize nutrition screening tool and intervene as necessary. Determine patient's food preferences and provide high-protein, high-caloric foods as appropriate.     INTERVENTIONS:  - Monitor oral intake, urinary output, labs, and treatment plans  - Assess nutrition and hydration status and recommend course of action  - Evaluate amount of meals eaten  - Assist patient with eating if necessary   - Allow adequate time for meals  - Recommend/ encourage appropriate diets, oral nutritional supplements, and vitamin/mineral supplements  - Order, calculate, and assess calorie counts as needed  - Recommend, monitor, and adjust tube feedings and TPN/PPN based on assessed needs  - Assess need for intravenous fluids  - Provide specific nutrition/hydration education as appropriate  - Include patient/family/caregiver in decisions related to nutrition  Outcome: Progressing      No

## 2025-01-16 NOTE — H&P ADULT - SOCIAL HISTORY: TOBACCO USE
Current cigarette smoker; started smoking at age 7 years old, with maximum amount smoked daily = 2 to 3 packs.  Now smoking ~ 0.5 ppd, in an attempt at quitting. Current cigarette smoker; started smoking at age 7 years old, with maximum amount smoked daily = 2 to 3 packs.  Now smoking ~ 0.5 ppd, in an attempt at quitting.  Accepts offer for nicotine transdermal, due to current significant craving

## 2025-01-16 NOTE — H&P ADULT - NSVTERISKREFERASSESS_GEN_ALL_CORE
MS RN NOTES-- PT SEEN AND EXAMINED BY RENETTA REARDON. CHANGED DIET TO CLEAR LIQUIDS. D/C 
POTASSIUM BAGS, NEW ORDER KDUR 40MEQ PO. ORDERS READ BACK AND VERIFIED. ORDERS NOTED AND 
CARRIED OUT. Refer to the Assessment tab to view/cancel completed assessment.
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
## 2025-01-16 NOTE — ED PROVIDER NOTE - ST/T WAVE
EKG w/ questionable hyperacute T waves unchanged from previous few days ago. No acute diagnostic ischemic changes.

## 2025-01-16 NOTE — H&P ADULT - NSICDXPASTSURGICALHX_GEN_ALL_CORE_FT
PAST SURGICAL HISTORY:  No significant past surgical history      PAST SURGICAL HISTORY:  H/O shoulder surgery

## 2025-01-16 NOTE — H&P ADULT - ASSESSMENT
[ x ]  Lab studies personally reviewed  [ x ]  Radiology personally reviewed  [ x ]  Old records personally reviewed    34-year-old male, with past history significant for DVT of the left lower extremity (diagnosed a few days ago), Anxiety, Depression, Bipolar 1 disorder, and Substance use disorder (fentanyl, xylazine), presented to the ED, after leaving the Hospital AMA on 01/24/2025, secondary to need for further management of DVT, elevated troponin and substance use disorder.  Diagnosed with Acute Deep Vein Thrombosis of Lower Extremity, Elevated Troponin and Substance Use Disorder in the ED. [ x ]  Lab studies personally reviewed  [ x ]  Radiology personally reviewed  [ x ]  Old records personally reviewed    34-year-old male, with past history significant for DVT of the left lower extremity (diagnosed a few days ago), Anxiety, Depression, Bipolar 1 disorder, and Substance use disorder (fentanyl, xylazine), presented to the ED, after leaving the Hospital AMA on 01/24/2025, secondary to need for further management of DVT, elevated troponin and substance use disorder.  Diagnosed with Acute Deep Vein Thrombosis of Lower Extremity, Elevated Troponin and Substance Use Disorder in the ED.      ***Patient left AMA on 1/15/25 reporting he was upset he was in hallway with his medical and past drug use history being discussed in public. Recommend to improve compliance, refrain from discussing such matters in public setting ****.      Acute deep venous thrombosis: below the knee.    Nonocclusive thrombus in the left gastrocnemius vein.    Heterogeneous enlarged appearance of what appears to be left distal thigh   musculature. Cannot exclude mass. Recommend further evaluation with MRI   with intravenous contrast.

## 2025-01-16 NOTE — H&P ADULT - PROBLEM SELECTOR PLAN 2
Troponin currently 242 (was > 335 on 01/15/2025).  CK = 380, CKMB = 46, CPK Mass assay = 124  ECG = sinus rhythm at 106 bpm, QTc = 422  Tachycardic to 122 initially in the ED  BP elevated to 162/90 upon initial evaluation  No chest pain, palpitations, shortness of breath, headache, dizziness  - CTA of 01/15/2025 negative for PE  - f/u TTE (ordered)  - already on heparin drip for confirmed DVT  - Cardiology consult in the AM (please call)

## 2025-01-16 NOTE — H&P ADULT - PROBLEM SELECTOR PLAN 3
Patient reports being at [facility] due to swelling of B/L LE.  States thigh area significantly swollen at the time, but with lessened edema presently  US = "heterogeneous enlarged appearance of what appears to be left distal thigh musculature. Cannot exclude mass. ..."  MRI w/ IV contrast recommended; ordering (please f/u)

## 2025-01-16 NOTE — H&P ADULT - PROBLEM SELECTOR PLAN 5
Sequela of being prescribed high dose narcotics post left shoulder surgery.  Describes needing substitute when Oxycontin 80 mg (and more) no longer being prescribed  Heroin was ineffective, due to tolerance to high dose opiate, per patient.  Turned to fentanyl, but was unaware of fentanyl being mixed with xylazine

## 2025-01-16 NOTE — ED ADULT NURSE REASSESSMENT NOTE - NS ED NURSE REASSESS COMMENT FT1
Break RN: Pt is A&Ox4, resting in stretcher with no complaints at this time. Respirations even and unlabored, chest rise equal b/l. Low sinus tachycardia noted on cardiac monitor. Pt denies chest pain, SOB, abdominal pain, N/V/D, h/a, dizziness, numbness/tingling or any urinary symptoms at this time. 2nd 20g IVL placed in left wrist. Heparin infusion started and verified with GABE Patel at bedside. Medications administered as ordered, see MAR. No acute distress noted. Safety maintained throughout.

## 2025-01-16 NOTE — ED ADULT NURSE REASSESSMENT NOTE - NS ED NURSE REASSESS COMMENT FT1
Received report front GABE Huynh.  Pt awake and alert, A&OX4, resp even and unlabored. Pt denying any complaints at this time. Pt is low sinus tachy on the monitor. Heparin running at 13 mL/hr. See MAR. Pt in no acute distress. Denies CP, SOB, HA, dizziness, palpitations, blurry vision. Resting comfortably. Safety maintained.

## 2025-01-16 NOTE — ED PROVIDER NOTE - OBJECTIVE STATEMENT
34-year-old male w/ history of anxiety, depression, bipolar 1 disorder, substance use disorder (frequent past user of fentanyl he reports mixed with xylazine frequently), presenting to the emergency department after leaving AMA with known diagnosis of lower extremity DVTs.  Patient initially presented 1/14/2025 for bilateral lower extremity swelling and pain.  Initial workup was notable for left lower extremity DVT below the knee (nonocclusive thrombus in the left gastrocnemius vein).  Also was noted to have heterogenous enlarged appearance of left distal thigh musculature with mass not able to be excluded.  Patient was seen by cardiology also for elevated troponin 335––> 294.  Concern at the time is for NSTEMI/type II versus myocarditis.  Initial EKG had hyperacute T waves in the lateral precordial leads that was also concerning for myocarditis likely associated xylazine.  He was recommended for TTE, MRI lower extremity.  Had CTA that showed no evidence of pulmonary embolism.  Patient ultimately left AMA without after mentioned workup completed.  He was prescribed Eliquis starter pack but has yet to pick it up or take any dosages.  So he has been off anticoagulation for over 24 hours.  He is denying any chest pain, shortness of breath.  His only new interval symptom is worsening of his left lower extremity pain and paresthesias.  Patient reports has not consumed fentanyl or xylazine in several weeks to month.  Reports however he has been self titrating with methadone and Suboxone.  Unknown dosage.    OF NOTE: Patient left AMA due to concerns about being in hallway with his personal medical information, drug use hx, etc being discussed in public setting.
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
## 2025-01-16 NOTE — ED PROVIDER NOTE - CARE PLAN
1 Principal Discharge DX:	Acute deep vein thrombosis (DVT) of lower extremity  Secondary Diagnosis:	Elevated troponin  Secondary Diagnosis:	Substance use disorder

## 2025-01-16 NOTE — ED PROVIDER NOTE - CLINICAL SUMMARY MEDICAL DECISION MAKING FREE TEXT BOX
34-year-old male history of anxiety, depression, bipolar 1 disorder, substance use disorder (past user several weeks ago of fentanyl mixed with xylazine), presented to ED after leaving [facility] on 1/15/2025 with diagnosis of lower extremity DVT as well as troponinemia that was concerning for type II MI versus myocarditis in the setting of xylazine usage.  Troponin 335 down to 294 while trending.  Initial EKG had hyperacute T waves in the lateral precordial leads.  Patient was ultimately admitted for recommendations for telemetry monitoring, troponin monitoring/trending, TTE, MRI of the lower extremity.  Had a CTA that showed no evidence of pulmonary embolism.  Prescribed Eliquis starter pack but has not taken AC since discontinuing heparin drip upon discharge.  Only interval complaint is increased the left lower extremity paresthesias and pain.  On exam patient is afebrile, hemodynamically stable albeit with mild tachycardia to 102–105.  EKG is sinus tach with no acute diagnostic ischemic changes but questionable hyperacute T waves again in the precordial leads that appear unchanged from previous EKG.  CV exam unremarkable.  Lungs clear bilaterally.  Left lower extremity is tender to palpation, 2+ distal DP pulses, however there is subjective decrease sensation and 4- out of 5 strength potentially secondary to pain limitation.  No evidence of phlegmasia alba Emmanuel's or cerulea dolens at this time.  Compartments are soft.  Plan for repeat cardiac enzymes, type and screen, coags, screening labs, tox and urine screen, hypercoagulable labs, TSH, lipid panel. Patient TBA tele to complete work up from previous admission stay. Tox to be consulted for recs for potential opioid withdrawal. 34-year-old male history of anxiety, depression, bipolar 1 disorder, substance use disorder (past user several weeks ago of fentanyl mixed with xylazine), presented to ED after leaving [place] on 1/15/2025 with diagnosis of lower extremity DVT as well as troponinemia that was concerning for type II MI versus myocarditis in the setting of xylazine usage.  Troponin 335 down to 294 while trending.  Initial EKG had hyperacute T waves in the lateral precordial leads.  Patient was ultimately admitted for recommendations for telemetry monitoring, troponin monitoring/trending, TTE, MRI of the lower extremity.  Had a CTA that showed no evidence of pulmonary embolism.  Prescribed Eliquis starter pack but has not taken AC since discontinuing heparin drip upon discharge.  Only interval complaint is increased the left lower extremity paresthesias and pain.  On exam patient is afebrile, hemodynamically stable albeit with mild tachycardia to 102–105.  EKG is sinus tach with no acute diagnostic ischemic changes but questionable hyperacute T waves again in the precordial leads that appear unchanged from previous EKG.  CV exam unremarkable.  Lungs clear bilaterally.  Left lower extremity is tender to palpation, 2+ distal DP pulses, however there is subjective decrease sensation and 4- out of 5 strength potentially secondary to pain limitation.  No evidence of phlegmasia alba Emmanuel's or cerulea dolens at this time.  Compartments are soft.  Plan for repeat cardiac enzymes, type and screen, coags, screening labs, tox and urine screen, hypercoagulable labs, TSH, lipid panel. Patient TBA tele to complete work up from previous admission stay. Tox to be consulted for recs for potential opioid withdrawal.    Halie Mehta MD attending physician.  Patient comes in with known DVTs both lower extremities.  He left AMA less than 24 hours ago.  Patient was upset that his drug use was discussed in a very public area and asked that that not be done again.  Of note patient has been using in the past fentanyl plus xylazine.  He said he last used on December 27 but has been using Suboxone and methadone in an alternating manner since then not supported by any particular program but getting from the street.  Of note he also had elevated troponin and they were concerned he had an NSTEMI versus myocarditis.  He had hyperacute T waves previously was supposed get a TTE and an MRI of his lower extremities.  Has no PE.    He was on heparin initially but has not had it since he left.  Has been off anticoagulation over 24 hours.  Denies chest pain shortness of breath.    Here patient with vital signs 157/92 respiratory rate 18 heart rate 101 temp 98.2 O2 sat is 98.    Pt alert and can phonate well is cooperative with discussion and understands the implications of both DVT and being admitted  h at/nc  perrl, conj clear, sclera anicteric,  neck supple  neueo awake, lucid normal gait moves all extremities with strength  psych normal affect  vs reasonable  Rest of exam as documented by resident    Plan is to admit the patient would also start him on likely Suboxone while he is here.  Toxicology will be involved to offer the recommendation
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
## 2025-01-16 NOTE — ED ADULT NURSE NOTE - OBJECTIVE STATEMENT
Pt received to intake rm 13  , awake and alert, A&OX4, ambulatory. Hx DVT, seizures. C/o. b/l leg pain and numbness. Pt was here yesterday but AMA. Pt is now back because he was dx with b/l DVT. Pt denies any chest pain but states she is SOB.  No swelling or redness noted to legs. Patient able to speak in clear sentences, respirations equal and unlabored on room air.  Respirations even and unlabored. Chest rise equal b/l. Denies CP, SOB, N/V, HA, dizziness, palpitations, blurry vision. 20G IV placed to  L AC, labs drawn and sent.  . Bed in lowest position, call bell within reach. Safety maintained.

## 2025-01-16 NOTE — H&P ADULT - PROBLEM SELECTOR PLAN 9
History of Anxiety, Depression, Bipolar disorder  Complicated by insomnia (comments on extreme sleep difficulty)  Not on medications PTA  No SI, HI, AH, VH presently  - f/u TSH and vitamin D levels in the AM  - Psychiatry consult in the AM (please call)

## 2025-01-16 NOTE — ED PROVIDER NOTE - PROGRESS NOTE DETAILS
Bryce Mueller MD PGY2: Patient reassessed, stable. COWS currently around 4. Tox consulted. Recommending dose of 8mg suboxone now, final recs pending but anticipate recs of 8mg BID. Bryce Mueller MD, PGY2:    ***Patient left AMA on 1/15/25 reporting he was upset he was in hallway with his medical and past drug use history being discussed in public. Recommend to improve compliance, refrain from discussing such matters in public setting ****

## 2025-01-16 NOTE — ED ADULT NURSE NOTE - NSFALLUNIVINTERV_ED_ALL_ED
Bed/Stretcher in lowest position, wheels locked, appropriate side rails in place/Call bell, personal items and telephone in reach/Instruct patient to call for assistance before getting out of bed/chair/stretcher/Non-slip footwear applied when patient is off stretcher/[place] to call system/Physically safe environment - no spills, clutter or unnecessary equipment/Purposeful proactive rounding/Room/bathroom lighting operational, light cord in reach
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
## 2025-01-16 NOTE — H&P ADULT - NSHPLABSRESULTS_GEN_ALL_CORE
LAB-WORK/STUDIES:                          10.2   11.10 )-----------( 566      ( 16 Jan 2025 18:17 )             32.8     138  |  101  |  10  ----------------------------<  94     01-16  3.6   |  26  |  0.75    Ca    8.7      16 Jan 2025 18:17    TPro  7.1  /  Alb  3.7  /  TBili  <0.2  /  DBili  x   /  AST  25  /  ALT  29  /  AlkPhos  107  01-16    PT/INR: 12.8/1.08 (01-16-25 @ 18:17)  PTT: 27.3 (01-16-25 @ 18:17)    hs Troponin, T - 242 ng/L (01-16-25 @ 18:17)    Pro-BNP: 145 (01-16-25 @ 18:17)    Hgb A1c: 4.8 (01-16-25 @ 18:17)    =====================================        =====================================  . LAB-WORK/STUDIES:                          10.2   11.10 )-----------( 566      ( 16 Jan 2025 18:17 )             32.8     138  |  101  |  10  ----------------------------<  94     01-16  3.6   |  26  |  0.75    Ca    8.7      16 Jan 2025 18:17    TPro  7.1  /  Alb  3.7  /  TBili  <0.2  /  DBili  x   /  AST  25  /  ALT  29  /  AlkPhos  107  01-16    PT/INR: 12.8/1.08 (01-16-25 @ 18:17)  PTT: 27.3 (01-16-25 @ 18:17)    hs Troponin, T - 242 ng/L (01-16-25 @ 18:17)    Pro-BNP: 145 (01-16-25 @ 18:17)    Hgb A1c: 4.8 (01-16-25 @ 18:17)    =====================================    ECG = sinus rhythm at 106 bpm, QTc = 422    =====================================  .

## 2025-01-16 NOTE — H&P ADULT - NSHPPHYSICALEXAM_GEN_ALL_CORE
Vital Signs Last 24 Hrs  T(C): 36.8 (16 Jan 2025 16:56), Max: 36.8 (16 Jan 2025 15:44)  T(F): 98.2 (16 Jan 2025 16:56), Max: 98.2 (16 Jan 2025 15:44)  HR: 101 (16 Jan 2025 16:56) (101 - 122)  BP: 157/92 (16 Jan 2025 16:56) (157/92 - 162/90)  BP(mean): --  RR: 18 (16 Jan 2025 16:56) (18 - 20)  SpO2: 98% (16 Jan 2025 16:56) (98% - 99%)    Parameters below as of 16 Jan 2025 16:56  Patient On (Oxygen Delivery Method): room air    ==================================================  . Vital Signs Last 24 Hrs  T(C): 36.8 (16 Jan 2025 16:56), Max: 36.8 (16 Jan 2025 15:44)  T(F): 98.2 (16 Jan 2025 16:56), Max: 98.2 (16 Jan 2025 15:44)  HR: 101 (16 Jan 2025 16:56) (101 - 122)  BP: 157/92 (16 Jan 2025 16:56) (157/92 - 162/90)  BP(mean): --  RR: 18 (16 Jan 2025 16:56) (18 - 20)  SpO2: 98% (16 Jan 2025 16:56) (98% - 99%)    Parameters below as of 16 Jan 2025 16:56  Patient On (Oxygen Delivery Method): room air    ==================================================  PHYSICAL EXAMINATION:    APPEARANCE: Adequately nourished, fairly groomed male, lying propped up in bed.  A bit restless.  Cooperative.  NAD  HEENT: Dry lips.  Dry oral mucosa.  Partially edentulous.  Pupils reactive to light.  EOMI  LYMPHATIC: No lymphadenopathy appreciated  CARDIOVASCULAR: (+) S1 S2.  Tachycardic.  No JVD.  No murmurs.  (+) edema of B/L LE (L > R)  RESPIRATORY: No wheezing, rhonchi, crackles appreciated  GASTROINTESTINAL: Soft.  Non-tender.  (+) BS  GENITOURINARY: No suprapubic tenderness.  No CVA tenderness B/L  EXTREMITIES:   (+) edema of B/L LE (L > R).  Normal range of motion.  No clubbing, cyanosis or edema  MUSCULOSKELETAL: No atrophy.  No asymmetry.  Good ROM  SKIN: No rashes. No ecchymoses.  No cyanosis  PSYCHIATRIC: A&O x 3.  Cooperative.  Garrulous at times  NEUROLOGICAL: Decreased strength of the L-foot.  EDWARDS x 4 against gravity  VASCULAR: Peripheral pulses palpable
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
## 2025-01-16 NOTE — H&P ADULT - PROBLEM SELECTOR PLAN 11
Patient reports leaving AMA from the ED on the previous day because of feeling upset that his drug history was being discussed in the open (patient reports being on a stretcher in the hallway at the time)   - optimal discretion when discussing patient's medical care  - continue to encourage compliance with  medical care

## 2025-01-16 NOTE — H&P ADULT - NSHPSOCIALHISTORY_GEN_ALL_CORE
SOCIAL HISTORY:    Marital Status:  (  )    (  ) Single        (  )        (  )        (  )   Lives with:          (  ) Alone      (  ) Spouse     (  ) Children         (  ) Parents             (  ) Other    No history of smoking  No history of alcohol abuse  No history of illegal drug use    Occupation: SOCIAL HISTORY:    Marital Status:  (  )    ( x ) Single        (  )        (  )        (  )   Lives with:          (  ) Alone      (  ) Spouse     (  ) Children         (  ) Parents             (  ) Other    No history of smoking  No history of alcohol abuse  No history of illegal drug use    Occupation: SOCIAL HISTORY:    Marital Status:  (  )    ( x ) Single        (  )        (  )        (  )   Lives with:          (  ) Alone      (  ) Spouse     (  ) Children         (  ) Parents             (  ) Other    No history of alcohol abuse    Occupation:

## 2025-01-16 NOTE — ED PROVIDER NOTE - PHYSICAL EXAMINATION
GEN: Patient awake and alert. No acute distress, non-toxic. Well appearing.   Head: Normocephalic, atraumatic.  Neck: Nontender, full ROM.   Eyes: PERRLA b/l. EOMI, no scleral icterus, no conjunctival injection. Moist mucous membranes.  CARDIAC: Regular, tachycardic. Normal S1, S2. No murmur, rubs, or gallops. No peripheral edema noted.  PULM: Speaking in full sentences. CTA B/L no wheeze, rales or rhonchi. No signs of respiratory distress, no accessory muscle usage or nasal flaring.  ABD: Soft, nontender, nondistended. No rebound, no involuntary guarding.   MSK/SKIN: Warm, dry, no rash, no lesions, no open wounds. No urticaria. No jaundice. Moving all extremities spontaneously. Full ROM in extremities. No obvious deformity. Compartments soft to b/l LE. 2+ distal DP pulses b/l. No evidence of phlegmasia alba dolens or phlegmasia cerulea dolens. Sensation decreased to distal left foot toes diffusely. 4-/5 dorsiflexion and plantarflexion of left ankle limited 2/2 pain.   NEURO: A&Ox3, no focal neurological deficits  SKIN: Warm, dry, no rash, no lesions, no open wounds. No urticaria. No jaundice.

## 2025-01-16 NOTE — H&P ADULT - PROBLEM SELECTOR PLAN 10
Currently at 0.5 ppd after smoking 2 to 3 packs daily since ~ age 7 years old  Trying to quit  Has significant nicotine craving, as reported by patient  - accepts offer for Nicotine patch ; prescribed  - encourage smoking cessation, as tolerated  - would benefit from Social Work consult

## 2025-01-17 DIAGNOSIS — F99 MENTAL DISORDER, NOT OTHERWISE SPECIFIED: ICD-10-CM

## 2025-01-17 DIAGNOSIS — F17.200 NICOTINE DEPENDENCE, UNSPECIFIED, UNCOMPLICATED: ICD-10-CM

## 2025-01-17 DIAGNOSIS — Z29.9 ENCOUNTER FOR PROPHYLACTIC MEASURES, UNSPECIFIED: ICD-10-CM

## 2025-01-17 DIAGNOSIS — F19.10 OTHER PSYCHOACTIVE SUBSTANCE ABUSE, UNCOMPLICATED: ICD-10-CM

## 2025-01-17 DIAGNOSIS — Z98.890 OTHER SPECIFIED POSTPROCEDURAL STATES: Chronic | ICD-10-CM

## 2025-01-17 DIAGNOSIS — R29.898 OTHER SYMPTOMS AND SIGNS INVOLVING THE MUSCULOSKELETAL SYSTEM: ICD-10-CM

## 2025-01-17 DIAGNOSIS — R04.0 EPISTAXIS: ICD-10-CM

## 2025-01-17 DIAGNOSIS — Z00.00 ENCOUNTER FOR GENERAL ADULT MEDICAL EXAMINATION WITHOUT ABNORMAL FINDINGS: ICD-10-CM

## 2025-01-17 DIAGNOSIS — M79.89 OTHER SPECIFIED SOFT TISSUE DISORDERS: ICD-10-CM

## 2025-01-17 LAB
24R-OH-CALCIDIOL SERPL-MCNC: 7.5 NG/ML — LOW (ref 30–80)
ANION GAP SERPL CALC-SCNC: 10 MMOL/L — SIGNIFICANT CHANGE UP (ref 7–14)
APTT BLD: 43.2 SEC — HIGH (ref 24.5–35.6)
APTT BLD: 64.2 SEC — HIGH (ref 24.5–35.6)
BUN SERPL-MCNC: 8 MG/DL — SIGNIFICANT CHANGE UP (ref 7–23)
CALCIUM SERPL-MCNC: 8.5 MG/DL — SIGNIFICANT CHANGE UP (ref 8.4–10.5)
CHLORIDE SERPL-SCNC: 103 MMOL/L — SIGNIFICANT CHANGE UP (ref 98–107)
CK MB BLD-MCNC: 10.3 % — HIGH (ref 0–2.5)
CK MB CFR SERPL CALC: 31.9 NG/ML — HIGH
CK SERPL-CCNC: 311 U/L — HIGH (ref 30–200)
CO2 SERPL-SCNC: 24 MMOL/L — SIGNIFICANT CHANGE UP (ref 22–31)
CREAT SERPL-MCNC: 0.67 MG/DL — SIGNIFICANT CHANGE UP (ref 0.5–1.3)
EGFR: 124 ML/MIN/1.73M2 — SIGNIFICANT CHANGE UP
FERRITIN SERPL-MCNC: 78 NG/ML — SIGNIFICANT CHANGE UP (ref 30–400)
GLUCOSE SERPL-MCNC: 94 MG/DL — SIGNIFICANT CHANGE UP (ref 70–99)
HCT VFR BLD CALC: 30.5 % — LOW (ref 39–50)
HCT VFR BLD CALC: 32 % — LOW (ref 39–50)
HCYS SERPL-MCNC: 11.1 UMOL/L — SIGNIFICANT CHANGE UP
HGB BLD-MCNC: 10 G/DL — LOW (ref 13–17)
HGB BLD-MCNC: 10.1 G/DL — LOW (ref 13–17)
IRON SATN MFR SERPL: 14 % — SIGNIFICANT CHANGE UP (ref 14–50)
IRON SATN MFR SERPL: 37 UG/DL — LOW (ref 45–165)
MAGNESIUM SERPL-MCNC: 1.8 MG/DL — SIGNIFICANT CHANGE UP (ref 1.6–2.6)
MCHC RBC-ENTMCNC: 27.1 PG — SIGNIFICANT CHANGE UP (ref 27–34)
MCHC RBC-ENTMCNC: 28.1 PG — SIGNIFICANT CHANGE UP (ref 27–34)
MCHC RBC-ENTMCNC: 31.3 G/DL — LOW (ref 32–36)
MCHC RBC-ENTMCNC: 33.1 G/DL — SIGNIFICANT CHANGE UP (ref 32–36)
MCV RBC AUTO: 85 FL — SIGNIFICANT CHANGE UP (ref 80–100)
MCV RBC AUTO: 86.7 FL — SIGNIFICANT CHANGE UP (ref 80–100)
NRBC # BLD AUTO: 0 K/UL — SIGNIFICANT CHANGE UP (ref 0–0)
NRBC # BLD AUTO: 0 K/UL — SIGNIFICANT CHANGE UP (ref 0–0)
NRBC # BLD: 0 /100 WBCS — SIGNIFICANT CHANGE UP (ref 0–0)
NRBC # BLD: 0 /100 WBCS — SIGNIFICANT CHANGE UP (ref 0–0)
NRBC # FLD: 0 K/UL — SIGNIFICANT CHANGE UP (ref 0–0)
NRBC # FLD: 0 K/UL — SIGNIFICANT CHANGE UP (ref 0–0)
NRBC BLD-RTO: 0 /100 WBCS — SIGNIFICANT CHANGE UP (ref 0–0)
NRBC BLD-RTO: 0 /100 WBCS — SIGNIFICANT CHANGE UP (ref 0–0)
PHOSPHATE SERPL-MCNC: 3.5 MG/DL — SIGNIFICANT CHANGE UP (ref 2.5–4.5)
PLATELET # BLD AUTO: 539 K/UL — HIGH (ref 150–400)
PLATELET # BLD AUTO: 584 K/UL — HIGH (ref 150–400)
POTASSIUM SERPL-MCNC: 3.7 MMOL/L — SIGNIFICANT CHANGE UP (ref 3.5–5.3)
POTASSIUM SERPL-SCNC: 3.7 MMOL/L — SIGNIFICANT CHANGE UP (ref 3.5–5.3)
PROT C ACT/NOR PPP: 78 % — SIGNIFICANT CHANGE UP (ref 74–150)
PROT S FREE AG PPP IA-ACNC: 80 % — SIGNIFICANT CHANGE UP (ref 67–141)
RBC # BLD: 3.59 M/UL — LOW (ref 4.2–5.8)
RBC # BLD: 3.59 M/UL — LOW (ref 4.2–5.8)
RBC # BLD: 3.69 M/UL — LOW (ref 4.2–5.8)
RBC # FLD: 16.8 % — HIGH (ref 10.3–14.5)
RBC # FLD: 17.1 % — HIGH (ref 10.3–14.5)
RETICS #: 67.1 K/UL — SIGNIFICANT CHANGE UP (ref 25–125)
RETICS/RBC NFR: 1.9 % — SIGNIFICANT CHANGE UP (ref 0.5–2.5)
SODIUM SERPL-SCNC: 137 MMOL/L — SIGNIFICANT CHANGE UP (ref 135–145)
TIBC SERPL-MCNC: 260 UG/DL — SIGNIFICANT CHANGE UP (ref 220–430)
TROPONIN T, HIGH SENSITIVITY RESULT: 264 NG/L — CRITICAL HIGH
UIBC SERPL-MCNC: 223 UG/DL — SIGNIFICANT CHANGE UP (ref 110–370)
WBC # BLD: 8.41 K/UL — SIGNIFICANT CHANGE UP (ref 3.8–10.5)
WBC # BLD: 9.59 K/UL — SIGNIFICANT CHANGE UP (ref 3.8–10.5)
WBC # FLD AUTO: 8.41 K/UL — SIGNIFICANT CHANGE UP (ref 3.8–10.5)
WBC # FLD AUTO: 9.59 K/UL — SIGNIFICANT CHANGE UP (ref 3.8–10.5)

## 2025-01-17 PROCEDURE — 93306 TTE W/DOPPLER COMPLETE: CPT | Mod: 26

## 2025-01-17 PROCEDURE — 76376 3D RENDER W/INTRP POSTPROCES: CPT | Mod: 26

## 2025-01-17 PROCEDURE — 99233 SBSQ HOSP IP/OBS HIGH 50: CPT

## 2025-01-17 RX ORDER — ENOXAPARIN SODIUM 100 MG/ML
70 INJECTION SUBCUTANEOUS EVERY 12 HOURS
Refills: 0 | Status: DISCONTINUED | OUTPATIENT
Start: 2025-01-17 | End: 2025-01-21

## 2025-01-17 RX ORDER — BUPRENORPHINE AND NALOXONE 8; 2 MG/1; MG/1
1 FILM BUCCAL; SUBLINGUAL ONCE
Refills: 0 | Status: DISCONTINUED | OUTPATIENT
Start: 2025-01-17 | End: 2025-01-17

## 2025-01-17 RX ORDER — SODIUM CHLORIDE 0.4 %
2 AEROSOL, SPRAY (ML) NASAL
Refills: 0 | Status: DISCONTINUED | OUTPATIENT
Start: 2025-01-17 | End: 2025-01-21

## 2025-01-17 RX ORDER — SODIUM CHLORIDE 9 G/ML
1000 INJECTION, SOLUTION INTRAVENOUS ONCE
Refills: 0 | Status: COMPLETED | OUTPATIENT
Start: 2025-01-17 | End: 2025-01-17

## 2025-01-17 RX ORDER — NICOTINE POLACRILEX 4 MG/1
1 LOZENGE ORAL DAILY
Refills: 0 | Status: DISCONTINUED | OUTPATIENT
Start: 2025-01-17 | End: 2025-01-21

## 2025-01-17 RX ORDER — SODIUM CHLORIDE 0.4 %
2 AEROSOL, SPRAY (ML) NASAL
Refills: 0 | Status: DISCONTINUED | OUTPATIENT
Start: 2025-01-17 | End: 2025-01-17

## 2025-01-17 RX ORDER — KETOROLAC TROMETHAMINE 10 MG
30 TABLET ORAL EVERY 6 HOURS
Refills: 0 | Status: DISCONTINUED | OUTPATIENT
Start: 2025-01-17 | End: 2025-01-21

## 2025-01-17 RX ORDER — ACETAMINOPHEN, DIPHENHYDRAMINE HCL, PHENYLEPHRINE HCL 325; 25; 5 MG/1; MG/1; MG/1
3 TABLET ORAL AT BEDTIME
Refills: 0 | Status: DISCONTINUED | OUTPATIENT
Start: 2025-01-17 | End: 2025-01-21

## 2025-01-17 RX ORDER — ACETAMINOPHEN 160 MG/5ML
650 SUSPENSION ORAL EVERY 6 HOURS
Refills: 0 | Status: DISCONTINUED | OUTPATIENT
Start: 2025-01-17 | End: 2025-01-21

## 2025-01-17 RX ADMIN — Medication 30 MILLIGRAM(S): at 09:41

## 2025-01-17 RX ADMIN — Medication 1500 UNIT(S)/HR: at 02:02

## 2025-01-17 RX ADMIN — NICOTINE POLACRILEX 1 PATCH: 4 LOZENGE ORAL at 17:37

## 2025-01-17 RX ADMIN — NICOTINE POLACRILEX 1 PATCH: 4 LOZENGE ORAL at 01:24

## 2025-01-17 RX ADMIN — ENOXAPARIN SODIUM 70 MILLIGRAM(S): 100 INJECTION SUBCUTANEOUS at 17:16

## 2025-01-17 RX ADMIN — BUPRENORPHINE AND NALOXONE 1 TABLET(S): 8; 2 FILM BUCCAL; SUBLINGUAL at 20:52

## 2025-01-17 RX ADMIN — NICOTINE POLACRILEX 1 PATCH: 4 LOZENGE ORAL at 17:09

## 2025-01-17 RX ADMIN — Medication 1500 UNIT(S)/HR: at 12:48

## 2025-01-17 RX ADMIN — Medication 3000 UNIT(S): at 02:09

## 2025-01-17 RX ADMIN — Medication 1500 UNIT(S)/HR: at 08:55

## 2025-01-17 RX ADMIN — SODIUM CHLORIDE 1000 MILLILITER(S): 9 INJECTION, SOLUTION INTRAVENOUS at 01:25

## 2025-01-17 RX ADMIN — NICOTINE POLACRILEX 1 PATCH: 4 LOZENGE ORAL at 07:10

## 2025-01-17 RX ADMIN — Medication 1500 UNIT(S)/HR: at 08:29

## 2025-01-17 RX ADMIN — Medication 30 MILLIGRAM(S): at 11:00

## 2025-01-17 NOTE — PROGRESS NOTE ADULT - PROBLEM SELECTOR PLAN 6
Hgb = 10.2, MCV = 85.9  Has frequently recurring epistaxis (dark blood, and bright red blood) for the past month, at least  Anemia, however, likely of multifactorial etiology  - f/u anemia profile/iron studies and reticulocyte count  - f/u hemoglobin trend

## 2025-01-17 NOTE — PHYSICAL THERAPY INITIAL EVALUATION ADULT - PERTINENT HX OF CURRENT PROBLEM, REHAB EVAL
34-year-old male, with past history significant for DVT of the left lower extremity (diagnosed a few days ago), Anxiety, Depression, Bipolar 1 disorder, and Substance use disorder (fentanyl, xylazine), presented to the ED, after leaving the Hospital AMA on 01/15/2025, secondary to need for further management of DVT, elevated troponin and substance use disorder.  Seen and evaluated at bedside; a bit restless, but in NAD

## 2025-01-17 NOTE — PATIENT PROFILE ADULT - REASON FOR REFUSAL (REFER PATIENT TO HEALTHCARE PROVIDER FOR FOLLOW-UP):
For [facility] ultrasound:  - Central Scheduling [phone]   - Option #2, then option #1    
pt refused

## 2025-01-17 NOTE — PHYSICAL THERAPY INITIAL EVALUATION ADULT - ADDITIONAL COMMENTS
Pt reports he will stay with family in a home with many stairs, uses cane to ambulate, independent at baseline.    Patients Current SpO2: 99%

## 2025-01-17 NOTE — PROGRESS NOTE ADULT - PROBLEM SELECTOR PLAN 2
Troponin currently 242 (was > 335 on 01/15/2025).  CK = 380, CKMB = 46, CPK Mass assay = 124  ECG = sinus rhythm at 106 bpm, QTc = 422  Tachycardic to 122 initially in the ED  BP elevated to 162/90 upon initial evaluation  No chest pain, palpitations, shortness of breath, headache, dizziness  - CTA of 01/15/2025 negative for PE  - f/u TTE   - c/w lovenox full dose

## 2025-01-17 NOTE — PROGRESS NOTE ADULT - ASSESSMENT
[ x ]  Lab studies personally reviewed  [ x ]  Radiology personally reviewed  [ x ]  Old records personally reviewed    34-year-old male, with past history significant for DVT of the left lower extremity (diagnosed a few days ago), Anxiety, Depression, Bipolar 1 disorder, and Substance use disorder (fentanyl, xylazine), presented to the ED, after leaving the Hospital AMA on 01/24/2025, secondary to need for further management of DVT, elevated troponin and substance use disorder.  Diagnosed with Acute Deep Vein Thrombosis of Lower Extremity, Elevated Troponin and Substance Use Disorder in the ED

## 2025-01-17 NOTE — PROGRESS NOTE ADULT - PROBLEM SELECTOR PLAN 9
History of Anxiety, Depression, Bipolar disorder  Complicated by insomnia (comments on extreme sleep difficulty)  Not on medications PTA  No SI, HI, AH, VH presently  - f/u TSH and vitamin D levels in the AM  - Psychiatry consult pending recs

## 2025-01-17 NOTE — PROGRESS NOTE ADULT - PROBLEM SELECTOR PLAN 1
Presented with pain and swelling of bilateral lower extremities, with L > R  US of 01/15/2025 with finding of "Acute deep venous thrombosis: below the knee.  Nonocclusive thrombus in the left gastrocnemius vein..."  Had left AMA on the prior day and now returns for treatment  - heparin drip stopped -> Started Lovenox 70 bid (full dose)   - f/u lab-work  - analgesic PRN

## 2025-01-17 NOTE — PATIENT PROFILE ADULT - NSPROSPHOSPCHAPLAINYN_GEN_A_NUR
Diagnosis: Rheumatoid arthritis involving both knees, unspecified whether rheumatoid factor present (HCC) (M06.9)  Primary osteoarthritis of right knee [M17.11]  Right total knee arthroplasty        Next MD visit: none scheduled  Fall Risk: standard         Precautions: n/a          Medication Changes since last visit?: No   Date of last PN: 2022  Pain ratin/10  Subjective: Now only reports intermittent pain at 1-2/10 at worst but arrived painfree      Objective:  Still observed favoring (R)LE with functional movements not out of necessity but from habit. Assessment:   Needs to promote normal functional use of (R)LE. Treatment:   DAPJ:                TX#:7/ MEDICAID [id number]   Tx#: 8/MEDICAID Date:2022   Tx#: 9/MEDICAID Date:2022   Tx#: 10/MEDICAID Date:2022   Tx#:  per POC Date:2022   Tx#:  per POC Date:2022   Tx#:  per POC    THERAPEUTIC EX 45 mins 45 MINS 45 mins 45 mins 60 mins 60 mins     slantboard stretch L5 3 x 1 min; (R) only on last rep L5 3 x 1 min; (R) only on last rep L5 (R) only 2x1 min L5 (R) only 2 x 1 min L5 (R) only 2 x 1 min L5 (R) only 2 x 1 min L5 (R) only 2 x 1 min    TM retrowalk 12% grade 1mph x 10 mins focus on (R) knee flexion/ext 12% grade 1mph x 10 mins focus on (R) knee flexion/ext 12% grade 1. 2mph x 10 mins focus on (R) knee flexion/ext 12% grade 1. 2mph x 10 mins focus on (R) knee flexion/ext 12% grade 1. 2mph x 10 mins focus on (R) knee flexion/ext 12% grade 1. 2mph x 10 mins focus on (R) knee flexion/ext 12% grade 1. 2mph x 10 mins red tband resistance focus on (R) knee flexion/ext    (R) knee PROM 10 mins focus on ext 10 mins focus on ext 10 mins focus on ext 10 mins focus on ext       SB bridging   GSB 3x10 GSB 3x10 with contralateral hip/knee flexion for (R) glut activation       SB hams curls   GSB 3x10 GSB 3x10       Retro cable pulls     15lbs 3x10  20lbs 3x10    Lift and carry     1 to 3lb dumbells carrying up and down stairs x 5 ea      Stairs x 5       15 laps via reciprocal pattern 15 laps via reciprocal pattern 1lb ea ue    Reverse woodchops       10lbs 3x10 (B)    TKE Green tband 3x10 Blue tband 3x10   Green tband with airex step overs 3x10 Blue tband with airex step overs 3x10     Reverse TKE Green tband 3x10 Blue tband 3x10   Green tband with airex step overs 3x10 Blue tband with airex step overs 3x10     NuStep LE only full ext L7 x 10 mins L8 x 10 mins L8 x 10 mins L8 x 10 mins  L8 x 10 mins       Step up   BOSU 3x10 with march BOSU 3x10 with march Red tband around knees BOSU 3x10 with march and (L) knee flareout      Lateral step down  6\" 3x10   Stair 3x10  Stair 3x10    Stool scoots     5 laps (R)  10 laps    3 way hip BOSU 3x10 BOSU 3x10 BOSU yellow tband 3x10 BOSU yellow tband 3x10  BOSU green tband BOSU green tband    SL hip add           Lateral walking 10 x 10 ft red tband     5 x 20ft green tband 5 x 20 ft green tband    Fwd/retro monster walk 10 x 10 ft red tband     5 x 20 ft green tband 5 x 20 ft green tband    Fwd step down  3x10 6\" 3x10 on stair 3x10 on stair 3x10 on stair 3x10 stair 3x10 stair    Shuttle (R)  3 band 3x15 4 band 3x15 4 band 2x15 5 band 3x10 (R) 5 band 3x10 (R) green tband around knees 5 band 3x10 (R) green tband around knees    Shuttle (B)  6 band 3x10   8 band 3x10 with (L) SLR in full ext 8 band 3x10 with (L) SLR in full ext green tband around knees 8 band 3x10 with (L) SLR in full ext green tband around knees    Standing UE WB on wall alt/opp UE/LE lifts   3x10 with 3 sec hold 3x10 with 3 sec hold       Lateral step up with dips   BOSU 3x10 BOSU 3x10 BOSU with red tband around knees 3x10 BOSU with greentband around knees 3x10 BOSU with greentband around knees 3x10               MANUAL TX 8 mins 8 mins 8 mins 8 mins       (R) patellofemoral/tibfemoral 5 mins all planes 5 mins all planes 5 mins all planes 5 mins all planes       Scar mobs 3 mins (R) knee 3 mins (R) knee 3 mins (R) knee 3 mins (R) knee       HEP: Refer to patient instructions    Charges: EX4       Total Timed Treatment: 60 min  Total Treatment Time: 60 min      Plan: Continue to progress functional strengthening as tolerated no

## 2025-01-17 NOTE — PROGRESS NOTE ADULT - SUBJECTIVE AND OBJECTIVE BOX
[facility] Division of Hospital Medicine  Maxime Ruggiero MD  Pager (M-F, 8A-5P)    Patient is a 34y old  Male who presents with a chief complaint of Acute deep vein thrombosis of lower extremity, Elevated troponin, Substance use disorder (16 Jan 2025 21:49)      MEDICATIONS  (STANDING):  enoxaparin Injectable 70 milliGRAM(s) SubCutaneous every 12 hours  nicotine - 21 mG/24Hr(s) Patch 1 Patch Transdermal daily    MEDICATIONS  (PRN):  acetaminophen     Tablet .. 650 milliGRAM(s) Oral every 6 hours PRN Mild Pain (1 - 3)  ketorolac   Injectable 30 milliGRAM(s) IV Push every 6 hours PRN Moderate Pain (4 - 6) or Severe Pain (7 - 10)  melatonin 3 milliGRAM(s) Oral at bedtime PRN Insomnia  sodium chloride 0.65% Nasal 2 Spray(s) Both Nostrils four times a day PRN Nasal Congestion      CAPILLARY BLOOD GLUCOSE        I&O's Summary      PHYSICAL EXAM:  Vital Signs Last 24 Hrs  T(C): 37 (17 Jan 2025 09:46), Max: 37.1 (16 Jan 2025 21:45)  T(F): 98.6 (17 Jan 2025 09:46), Max: 98.8 (16 Jan 2025 21:45)  HR: 98 (17 Jan 2025 09:46) (88 - 122)  BP: 122/73 (17 Jan 2025 09:46) (122/73 - 162/90)  BP(mean): --  RR: 18 (17 Jan 2025 09:46) (16 - 20)  SpO2: 98% (17 Jan 2025 09:46) (98% - 99%)    Parameters below as of 17 Jan 2025 09:46  Patient On (Oxygen Delivery Method): room air      CONSTITUTIONAL: NAD, well-developed, well-groomed  EYES: PERRLA; conjunctiva and sclera clear  ENMT: Moist oral mucosa, no pharyngeal injection or exudates; normal dentition  NECK: Supple, no palpable masses; no thyromegaly  RESPIRATORY: Normal respiratory effort; lungs are clear to auscultation bilaterally  CARDIOVASCULAR: Regular rate and rhythm, normal S1 and S2, no murmur/rub/gallop; No lower extremity edema; Peripheral pulses are 2+ bilaterally  ABDOMEN: Nontender to palpation, normoactive bowel sounds, no rebound/guarding; No hepatosplenomegaly  MUSCLOSKELETAL:  Normal gait; no clubbing or cyanosis of digits; no joint swelling or tenderness to palpation  PSYCH: A+O to person, place, and time; affect appropriate  NEUROLOGY: CN 2-12 are intact and symmetric; no gross sensory deficits;   SKIN: No rashes; no palpable lesions    LABS:                        10.1   8.41  )-----------( 539      ( 17 Jan 2025 07:59 )             30.5     01-17    137  |  103  |  8   ----------------------------<  94  3.7   |  24  |  0.67    Ca    8.5      17 Jan 2025 07:59  Phos  3.5     01-17  Mg     1.80     01-17    TPro  7.1  /  Alb  3.7  /  TBili  <0.2  /  DBili  x   /  AST  25  /  ALT  29  /  AlkPhos  107  01-16    PT/INR - ( 16 Jan 2025 18:17 )   PT: 12.8 sec;   INR: 1.08 ratio         PTT - ( 17 Jan 2025 07:59 )  PTT:64.2 sec  CARDIAC MARKERS ( 17 Jan 2025 07:59 )  x     / x     / x     / x     / 31.9 ng/mL  CARDIAC MARKERS ( 16 Jan 2025 18:17 )  x     / x     / x     / x     / 46.0 ng/mL      Urinalysis Basic - ( 17 Jan 2025 07:59 )    Color: x / Appearance: x / SG: x / pH: x  Gluc: 94 mg/dL / Ketone: x  / Bili: x / Urobili: x   Blood: x / Protein: x / Nitrite: x   Leuk Esterase: x / RBC: x / WBC x   Sq Epi: x / Non Sq Epi: x / Bacteria: x          RADIOLOGY & ADDITIONAL TESTS:  Results Reviewed:   Imaging Personally Reviewed:  Electrocardiogram Personally Reviewed:    COORDINATION OF CARE:  Care Discussed with Consultants/Other Providers [Y/N]:  Prior or Outpatient Records Reviewed [Y/N]:

## 2025-01-18 LAB
ADD ON TEST-SPECIMEN IN LAB: SIGNIFICANT CHANGE UP
ANION GAP SERPL CALC-SCNC: 10 MMOL/L — SIGNIFICANT CHANGE UP (ref 7–14)
APTT BLD: 32.8 SEC — SIGNIFICANT CHANGE UP (ref 24.5–35.6)
BUN SERPL-MCNC: 14 MG/DL — SIGNIFICANT CHANGE UP (ref 7–23)
CALCIUM SERPL-MCNC: 8.4 MG/DL — SIGNIFICANT CHANGE UP (ref 8.4–10.5)
CHLORIDE SERPL-SCNC: 104 MMOL/L — SIGNIFICANT CHANGE UP (ref 98–107)
CO2 SERPL-SCNC: 24 MMOL/L — SIGNIFICANT CHANGE UP (ref 22–31)
CREAT SERPL-MCNC: 0.7 MG/DL — SIGNIFICANT CHANGE UP (ref 0.5–1.3)
EGFR: 122 ML/MIN/1.73M2 — SIGNIFICANT CHANGE UP
GLUCOSE SERPL-MCNC: 92 MG/DL — SIGNIFICANT CHANGE UP (ref 70–99)
HCT VFR BLD CALC: 30.3 % — LOW (ref 39–50)
HGB BLD-MCNC: 9.9 G/DL — LOW (ref 13–17)
INR BLD: 1.07 RATIO — SIGNIFICANT CHANGE UP (ref 0.85–1.16)
MAGNESIUM SERPL-MCNC: 1.8 MG/DL — SIGNIFICANT CHANGE UP (ref 1.6–2.6)
MCHC RBC-ENTMCNC: 27.5 PG — SIGNIFICANT CHANGE UP (ref 27–34)
MCHC RBC-ENTMCNC: 32.7 G/DL — SIGNIFICANT CHANGE UP (ref 32–36)
MCV RBC AUTO: 84.2 FL — SIGNIFICANT CHANGE UP (ref 80–100)
NRBC # BLD AUTO: 0 K/UL — SIGNIFICANT CHANGE UP (ref 0–0)
NRBC # BLD: 0 /100 WBCS — SIGNIFICANT CHANGE UP (ref 0–0)
NRBC # FLD: 0 K/UL — SIGNIFICANT CHANGE UP (ref 0–0)
NRBC BLD-RTO: 0 /100 WBCS — SIGNIFICANT CHANGE UP (ref 0–0)
PHOSPHATE SERPL-MCNC: 4.1 MG/DL — SIGNIFICANT CHANGE UP (ref 2.5–4.5)
PLATELET # BLD AUTO: 505 K/UL — HIGH (ref 150–400)
POTASSIUM SERPL-MCNC: 3.9 MMOL/L — SIGNIFICANT CHANGE UP (ref 3.5–5.3)
POTASSIUM SERPL-SCNC: 3.9 MMOL/L — SIGNIFICANT CHANGE UP (ref 3.5–5.3)
PROTHROM AB SERPL-ACNC: 12.7 SEC — SIGNIFICANT CHANGE UP (ref 9.9–13.4)
RBC # BLD: 3.6 M/UL — LOW (ref 4.2–5.8)
RBC # FLD: 16.5 % — HIGH (ref 10.3–14.5)
SODIUM SERPL-SCNC: 138 MMOL/L — SIGNIFICANT CHANGE UP (ref 135–145)
TROPONIN T, HIGH SENSITIVITY RESULT: 335 NG/L — CRITICAL HIGH
TSH SERPL-MCNC: 1.89 UIU/ML — SIGNIFICANT CHANGE UP (ref 0.27–4.2)
VIT D25+D1,25 OH+D1,25 PNL SERPL-MCNC: 67.4 PG/ML — SIGNIFICANT CHANGE UP (ref 19.9–79.3)
WBC # BLD: 8.25 K/UL — SIGNIFICANT CHANGE UP (ref 3.8–10.5)
WBC # FLD AUTO: 8.25 K/UL — SIGNIFICANT CHANGE UP (ref 3.8–10.5)

## 2025-01-18 PROCEDURE — 73720 MRI LWR EXTREMITY W/O&W/DYE: CPT | Mod: 26,LT

## 2025-01-18 PROCEDURE — 99233 SBSQ HOSP IP/OBS HIGH 50: CPT

## 2025-01-18 PROCEDURE — 93010 ELECTROCARDIOGRAM REPORT: CPT

## 2025-01-18 PROCEDURE — 90792 PSYCH DIAG EVAL W/MED SRVCS: CPT

## 2025-01-18 PROCEDURE — 72158 MRI LUMBAR SPINE W/O & W/DYE: CPT | Mod: 26

## 2025-01-18 RX ORDER — OLANZAPINE 10 MG/1
5 TABLET, FILM COATED ORAL AT BEDTIME
Refills: 0 | Status: DISCONTINUED | OUTPATIENT
Start: 2025-01-18 | End: 2025-01-19

## 2025-01-18 RX ORDER — BUPRENORPHINE AND NALOXONE 8; 2 MG/1; MG/1
1 FILM BUCCAL; SUBLINGUAL ONCE
Refills: 0 | Status: DISCONTINUED | OUTPATIENT
Start: 2025-01-18 | End: 2025-01-18

## 2025-01-18 RX ADMIN — OLANZAPINE 5 MILLIGRAM(S): 10 TABLET, FILM COATED ORAL at 22:38

## 2025-01-18 RX ADMIN — ENOXAPARIN SODIUM 70 MILLIGRAM(S): 100 INJECTION SUBCUTANEOUS at 05:26

## 2025-01-18 RX ADMIN — ENOXAPARIN SODIUM 70 MILLIGRAM(S): 100 INJECTION SUBCUTANEOUS at 20:00

## 2025-01-18 RX ADMIN — BUPRENORPHINE AND NALOXONE 1 TABLET(S): 8; 2 FILM BUCCAL; SUBLINGUAL at 22:38

## 2025-01-18 NOTE — BH CONSULTATION LIAISON ASSESSMENT NOTE - NSSUICPROTFACT_PSY_ALL_CORE
76 y.o. WF with CLAU and non-specific enteritis with small bowel ulcers who is here for follow up. She did have an EGD and incomplete colonoscopy in 12/2022 due to large ventral hernia. On EGD, there was mild gastritis and pathology was negative for H. pylori or celiac disease. She did have a virtual colonoscopy which showed diverticulosis, tortuous colon and large ventral hernia. A follow up small bowel capsule study showed isolated small bowel ulcers. She was taking Celebrex for years and has stopped this medication. She did start Mesalamine 0.375 mg four tabs/d and describes follow up labs showing improvement in iron indices. She denies any abdominal pain, no n/v, no diarrhea, no gross gib. A [facility] IBD test was NOT consistent with IBD. She was recommended to have a CT enterography for further evaluation of small bowel ulcers with differential for small bowel Crohn's disease vs ischemia and will defer this testing at this time. She has been recommended to consider surgical repair of large abdominal hernia and did see Dr. Sanchez for evaluation and will hold off surgery at this time. She did have recent labs on 8/3/2023 which showed normal CBC, Hgb of 12, MCV of 94, ferritin of 32. She did stop Celebrex 200mg/d and stopped this medication over 6 months ago. She is recommended to take Tylenol prn. She is no longer taking Mesalamine. She does  take Colesevelam 1 tab bid.
Supportive social network of family or friends

## 2025-01-18 NOTE — BH CONSULTATION LIAISON ASSESSMENT NOTE - NSBHCHARTREVIEWLAB_PSY_A_CORE FT
9.9    8.25  )-----------( 505      ( 18 Jan 2025 05:40 )             30.3   01-18    138  |  104  |  14  ----------------------------<  92  3.9   |  24  |  0.70    Ca    8.4      18 Jan 2025 05:40  Phos  4.1     01-18  Mg     1.80     01-18    TPro  7.1  /  Alb  3.7  /  TBili  <0.2  /  DBili  x   /  AST  25  /  ALT  29  /  AlkPhos  107  01-16

## 2025-01-18 NOTE — PROGRESS NOTE ADULT - ASSESSMENT
34-year-old male, with past history significant for DVT of the left lower extremity (diagnosed a few days ago), Anxiety, Depression, Bipolar 1 disorder, and Substance use disorder (fentanyl, xylazine), presented to the ED, after leaving the Hospital AMA on 01/24/2025, secondary to need for further management of DVT, elevated troponin and substance use disorder.  Diagnosed with Acute Deep Vein Thrombosis of Lower Extremity, Elevated Troponin and Substance Use Disorder in the ED. Was transitioned from Heparin GGT to therapeutic lovenox. Cardio consulted, reports no further intervention given normal TTE, will obtain repeat trop and follow up in the AM to inquire about indication for possible Cardiac MRI. MRI done significant for inflammation and small fluid collection. ID consulted, can monitor antibiotics for now. Psych recs appreciated.

## 2025-01-18 NOTE — BH CONSULTATION LIAISON ASSESSMENT NOTE - NSBHATTESTAPPAMEND_PSY_A_CORE
I have personally seen and examined this patient. I fully participated in the care of this patient. I have made amendments to the documentation where appropriate and otherwise agree with the history, physical exam, and plan as documented by the ERIN

## 2025-01-18 NOTE — BH CONSULTATION LIAISON ASSESSMENT NOTE - NSBHATTESTCOMMENTATTENDFT_PSY_A_CORE
Met with the patient via tele-platform along with ACP, impression and plan discussed and agreed upon

## 2025-01-18 NOTE — BH CONSULTATION LIAISON ASSESSMENT NOTE - NSBHMSEPERCOMMAND_PSY_A_CORE
Patient seen during PM rounds. TSP was consulted by primary team and  for PCP assignment and follow-up care navigation.     Patient is aware of our role in helping facilitate follow-up care upon discharge from the hospital. Patient is homeless and states he wants to \"get off the streets.\" Patient has multiple readmissions due to multiple conditions. Patient states he has not had a PCP in the past and has been in and out of [place] and other local OSHs.     For primary care, we will work on PCP assignment with [facility]. For housing, I have contacted [facility] and the Flexible Housing Pool for potential housing resources due to the patient's high rates of readmission and need for behavioral health interventions in the community. I have asked this team to meet with the patient tomorrow to establish a connection prior to discharge as the patient has not been responsive when attempting to contact his phone due to his housing situation.     Patient states he is agreeable to this plan.     All questions and concerns addressed at bedside. RN updated.     Diallo Gomez, MPH  Director, Transition Support Program  [facility]  Please contact via MagMe     No

## 2025-01-18 NOTE — PROGRESS NOTE ADULT - PROBLEM SELECTOR PLAN 2
Troponin currently 242 (was > 335 on 01/15/2025).  CK = 380, CKMB = 46, CPK Mass assay = 124  ECG = sinus rhythm at 106 bpm, QTc = 422  Tachycardic to 122 initially in the ED  BP elevated to 162/90 upon initial evaluation  No chest pain, palpitations, shortness of breath, headache, dizziness  - CTA of 01/15/2025 negative for PE  - TTE significant for trace regurgitation , repeat trop ordered   Continues to deny chest pain  - c/w lovenox full dose
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
## 2025-01-18 NOTE — BH CONSULTATION LIAISON ASSESSMENT NOTE - NSBHCONSULTFOLLOWAFTERCARE_PSY_A_CORE FT
CL will follow, please encourage patient to allow for medical care - can call sister to help encourage patient to stay and receive care   Sister with no acute psychiatric safety concerns at this time - states patient is trying to get his life back together - he has a daughter he wants to see and live for  CL will follow, please encourage patient to allow for medical care - can call sister to help encourage patient to stay and receive care   Sister with no acute psychiatric safety concerns at this time - states patient is trying to get his life back together - he has a daughter he wants to see and live for.     Medicine team + SBIRT to ensure patient has a f/u appt with Addiction recovery services for continued Suboxone treatment

## 2025-01-18 NOTE — BH CONSULTATION LIAISON ASSESSMENT NOTE - CURRENT MEDICATION
MEDICATIONS  (STANDING):  enoxaparin Injectable 70 milliGRAM(s) SubCutaneous every 12 hours  nicotine - 21 mG/24Hr(s) Patch 1 Patch Transdermal daily    MEDICATIONS  (PRN):  acetaminophen     Tablet .. 650 milliGRAM(s) Oral every 6 hours PRN Mild Pain (1 - 3)  ketorolac   Injectable 30 milliGRAM(s) IV Push every 6 hours PRN Moderate Pain (4 - 6) or Severe Pain (7 - 10)  melatonin 3 milliGRAM(s) Oral at bedtime PRN Insomnia  sodium chloride 0.65% Nasal 2 Spray(s) Both Nostrils four times a day PRN Nasal Congestion

## 2025-01-18 NOTE — BH CONSULTATION LIAISON ASSESSMENT NOTE - RISK ASSESSMENT
risk: male gender, hx of SA, undomiciled, substance use   protective: no Si or HI , help seeking, interested in medication management

## 2025-01-18 NOTE — PROGRESS NOTE ADULT - SUBJECTIVE AND OBJECTIVE BOX
Patient is a 34y old  Male who presents with a chief complaint of Acute deep vein thrombosis of lower extremity, Elevated troponin, Substance use disorder       Subjective:   NAEON. Patient examined at bedside, reports that "numbness" has resolved. Denies pain at the time of interview. HD stable. Patient reports hx of fentanyl/xylazine use states last use Dec 2024, reports intranasal use, denies IVDU.      MEDICATIONS  (STANDING):  enoxaparin Injectable 70 milliGRAM(s) SubCutaneous every 12 hours  nicotine - 21 mG/24Hr(s) Patch 1 Patch Transdermal daily    MEDICATIONS  (PRN):  acetaminophen     Tablet .. 650 milliGRAM(s) Oral every 6 hours PRN Mild Pain (1 - 3)  ketorolac   Injectable 30 milliGRAM(s) IV Push every 6 hours PRN Moderate Pain (4 - 6) or Severe Pain (7 - 10)  melatonin 3 milliGRAM(s) Oral at bedtime PRN Insomnia  sodium chloride 0.65% Nasal 2 Spray(s) Both Nostrils four times a day PRN Nasal Congestion      CAPILLARY BLOOD GLUCOSE        I&O's Summary      PHYSICAL EXAM:  Vital Signs Last 24 Hrs  T(C): 37 (17 Jan 2025 09:46), Max: 37.1 (16 Jan 2025 21:45)  T(F): 98.6 (17 Jan 2025 09:46), Max: 98.8 (16 Jan 2025 21:45)  HR: 98 (17 Jan 2025 09:46) (88 - 122)  BP: 122/73 (17 Jan 2025 09:46) (122/73 - 162/90)  BP(mean): --  RR: 18 (17 Jan 2025 09:46) (16 - 20)  SpO2: 98% (17 Jan 2025 09:46) (98% - 99%)    Parameters below as of 17 Jan 2025 09:46  Patient On (Oxygen Delivery Method): room air      CONSTITUTIONAL: NAD, well-developed, well-groomed  EYES: PERRLA; conjunctiva and sclera clear  ENMT: Moist oral mucosa, no pharyngeal injection or exudates; normal dentition  NECK: Supple, no palpable masses; no thyromegaly  RESPIRATORY: Normal respiratory effort; lungs are clear to auscultation bilaterally  CARDIOVASCULAR: Regular rate and rhythm, normal S1 and S2, no murmur/rub/gallop; No lower extremity edema; Peripheral pulses are 2+ bilaterally  ABDOMEN: Nontender to palpation, normoactive bowel sounds, no rebound/guarding; No hepatosplenomegaly  MUSCLOSKELETAL:  Normal gait; no clubbing or cyanosis of digits; no joint swelling or tenderness to palpation  PSYCH: A+O to person, place, and time; affect appropriate  NEUROLOGY: CN 2-12 are intact and symmetric; no gross sensory deficits;   SKIN: No rashes; no palpable lesions    LABS:                        10.1   8.41  )-----------( 539      ( 17 Jan 2025 07:59 )             30.5     01-17    137  |  103  |  8   ----------------------------<  94  3.7   |  24  |  0.67    Ca    8.5      17 Jan 2025 07:59  Phos  3.5     01-17  Mg     1.80     01-17    TPro  7.1  /  Alb  3.7  /  TBili  <0.2  /  DBili  x   /  AST  25  /  ALT  29  /  AlkPhos  107  01-16    PT/INR - ( 16 Jan 2025 18:17 )   PT: 12.8 sec;   INR: 1.08 ratio         PTT - ( 17 Jan 2025 07:59 )  PTT:64.2 sec  CARDIAC MARKERS ( 17 Jan 2025 07:59 )  x     / x     / x     / x     / 31.9 ng/mL  CARDIAC MARKERS ( 16 Jan 2025 18:17 )  x     / x     / x     / x     / 46.0 ng/mL      Urinalysis Basic - ( 17 Jan 2025 07:59 )    Color: x / Appearance: x / SG: x / pH: x  Gluc: 94 mg/dL / Ketone: x  / Bili: x / Urobili: x   Blood: x / Protein: x / Nitrite: x   Leuk Esterase: x / RBC: x / WBC x   Sq Epi: x / Non Sq Epi: x / Bacteria: x          RADIOLOGY & ADDITIONAL TESTS:  Results Reviewed:   Imaging Personally Reviewed:  Electrocardiogram Personally Reviewed:    COORDINATION OF CARE:  Care Discussed with Consultants/Other Providers [Y/N]:  Prior or Outpatient Records Reviewed [Y/N]:
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
## 2025-01-18 NOTE — BH CONSULTATION LIAISON ASSESSMENT NOTE - NSBHCHARTREVIEWVS_PSY_A_CORE FT
Vital Signs Last 24 Hrs  T(C): 36.9 (18 Jan 2025 05:15), Max: 36.9 (18 Jan 2025 05:15)  T(F): 98.5 (18 Jan 2025 05:15), Max: 98.5 (18 Jan 2025 05:15)  HR: 88 (18 Jan 2025 05:15) (88 - 102)  BP: 125/76 (18 Jan 2025 05:15) (125/76 - 146/97)  BP(mean): --  RR: 19 (18 Jan 2025 05:15) (17 - 19)  SpO2: 100% (18 Jan 2025 05:15) (98% - 100%)    Parameters below as of 18 Jan 2025 05:15  Patient On (Oxygen Delivery Method): room air

## 2025-01-19 LAB
ADD ON TEST-SPECIMEN IN LAB: SIGNIFICANT CHANGE UP
ANION GAP SERPL CALC-SCNC: 12 MMOL/L — SIGNIFICANT CHANGE UP (ref 7–14)
BUN SERPL-MCNC: 13 MG/DL — SIGNIFICANT CHANGE UP (ref 7–23)
CALCIUM SERPL-MCNC: 8.7 MG/DL — SIGNIFICANT CHANGE UP (ref 8.4–10.5)
CHLORIDE SERPL-SCNC: 103 MMOL/L — SIGNIFICANT CHANGE UP (ref 98–107)
CK MB BLD-MCNC: 8.7 % — HIGH (ref 0–2.5)
CK MB CFR SERPL CALC: 38.1 NG/ML — HIGH
CK SERPL-CCNC: 436 U/L — HIGH (ref 30–200)
CO2 SERPL-SCNC: 23 MMOL/L — SIGNIFICANT CHANGE UP (ref 22–31)
CREAT SERPL-MCNC: 0.69 MG/DL — SIGNIFICANT CHANGE UP (ref 0.5–1.3)
CRP SERPL-MCNC: <3 MG/L — SIGNIFICANT CHANGE UP
EGFR: 123 ML/MIN/1.73M2 — SIGNIFICANT CHANGE UP
ERYTHROCYTE [SEDIMENTATION RATE] IN BLOOD: 7 MM/HR — SIGNIFICANT CHANGE UP (ref 1–15)
GLUCOSE SERPL-MCNC: 82 MG/DL — SIGNIFICANT CHANGE UP (ref 70–99)
HCT VFR BLD CALC: 32.1 % — LOW (ref 39–50)
HGB BLD-MCNC: 10.5 G/DL — LOW (ref 13–17)
HIV 1+2 AB+HIV1 P24 AG SERPL QL IA: SIGNIFICANT CHANGE UP
MAGNESIUM SERPL-MCNC: 2 MG/DL — SIGNIFICANT CHANGE UP (ref 1.6–2.6)
MCHC RBC-ENTMCNC: 27.1 PG — SIGNIFICANT CHANGE UP (ref 27–34)
MCHC RBC-ENTMCNC: 32.7 G/DL — SIGNIFICANT CHANGE UP (ref 32–36)
MCV RBC AUTO: 82.7 FL — SIGNIFICANT CHANGE UP (ref 80–100)
NRBC # BLD AUTO: 0 K/UL — SIGNIFICANT CHANGE UP (ref 0–0)
NRBC # BLD: 0 /100 WBCS — SIGNIFICANT CHANGE UP (ref 0–0)
NRBC # FLD: 0 K/UL — SIGNIFICANT CHANGE UP (ref 0–0)
NRBC BLD-RTO: 0 /100 WBCS — SIGNIFICANT CHANGE UP (ref 0–0)
PHOSPHATE SERPL-MCNC: 4.2 MG/DL — SIGNIFICANT CHANGE UP (ref 2.5–4.5)
PLATELET # BLD AUTO: 518 K/UL — HIGH (ref 150–400)
POTASSIUM SERPL-MCNC: 3.7 MMOL/L — SIGNIFICANT CHANGE UP (ref 3.5–5.3)
POTASSIUM SERPL-SCNC: 3.7 MMOL/L — SIGNIFICANT CHANGE UP (ref 3.5–5.3)
RBC # BLD: 3.88 M/UL — LOW (ref 4.2–5.8)
RBC # FLD: 16.7 % — HIGH (ref 10.3–14.5)
SODIUM SERPL-SCNC: 138 MMOL/L — SIGNIFICANT CHANGE UP (ref 135–145)
TROPONIN T, HIGH SENSITIVITY RESULT: 285 NG/L — CRITICAL HIGH
TROPONIN T, HIGH SENSITIVITY RESULT: 292 NG/L — CRITICAL HIGH
WBC # BLD: 7.26 K/UL — SIGNIFICANT CHANGE UP (ref 3.8–10.5)
WBC # FLD AUTO: 7.26 K/UL — SIGNIFICANT CHANGE UP (ref 3.8–10.5)

## 2025-01-19 PROCEDURE — 93010 ELECTROCARDIOGRAM REPORT: CPT

## 2025-01-19 PROCEDURE — 99222 1ST HOSP IP/OBS MODERATE 55: CPT | Mod: GC

## 2025-01-19 PROCEDURE — 99233 SBSQ HOSP IP/OBS HIGH 50: CPT

## 2025-01-19 PROCEDURE — G0545: CPT

## 2025-01-19 RX ORDER — OLANZAPINE 10 MG/1
5 TABLET, FILM COATED ORAL EVERY 6 HOURS
Refills: 0 | Status: DISCONTINUED | OUTPATIENT
Start: 2025-01-19 | End: 2025-01-19

## 2025-01-19 RX ORDER — OLANZAPINE 10 MG/1
5 TABLET, FILM COATED ORAL AT BEDTIME
Refills: 0 | Status: DISCONTINUED | OUTPATIENT
Start: 2025-01-19 | End: 2025-01-20

## 2025-01-19 RX ORDER — OLANZAPINE 10 MG/1
5 TABLET, FILM COATED ORAL EVERY 6 HOURS
Refills: 0 | Status: DISCONTINUED | OUTPATIENT
Start: 2025-01-19 | End: 2025-01-21

## 2025-01-19 RX ORDER — BUPRENORPHINE AND NALOXONE 8; 2 MG/1; MG/1
1 FILM BUCCAL; SUBLINGUAL ONCE
Refills: 0 | Status: DISCONTINUED | OUTPATIENT
Start: 2025-01-19 | End: 2025-01-19

## 2025-01-19 RX ORDER — QUETIAPINE FUMARATE 300 MG/1
25 TABLET ORAL EVERY 6 HOURS
Refills: 0 | Status: DISCONTINUED | OUTPATIENT
Start: 2025-01-19 | End: 2025-01-21

## 2025-01-19 RX ADMIN — ENOXAPARIN SODIUM 70 MILLIGRAM(S): 100 INJECTION SUBCUTANEOUS at 21:31

## 2025-01-19 RX ADMIN — ENOXAPARIN SODIUM 70 MILLIGRAM(S): 100 INJECTION SUBCUTANEOUS at 05:19

## 2025-01-19 RX ADMIN — OLANZAPINE 5 MILLIGRAM(S): 10 TABLET, FILM COATED ORAL at 21:31

## 2025-01-19 RX ADMIN — BUPRENORPHINE AND NALOXONE 1 TABLET(S): 8; 2 FILM BUCCAL; SUBLINGUAL at 17:50

## 2025-01-19 NOTE — PROGRESS NOTE ADULT - PROBLEM SELECTOR PLAN 2
Troponin currently 242 (was > 335 on 01/15/2025).  CK = 380, CKMB = 46, CPK Mass assay = 124  ECG = sinus rhythm at 106 bpm, QTc = 422  Tachycardic to 122 initially in the ED  BP elevated to 162/90 upon initial evaluation  No chest pain, palpitations, shortness of breath, headache, dizziness  - CTA of 01/15/2025 negative for PE  - TTE significant for trace regurgitation , repeat trop ordered   Continues to deny chest pain  - c/w lovenox full dose Troponin currently 242 (was > 335 on 01/15/2025).  CK = 380, CKMB = 46, CPK Mass assay = 124  ECG = sinus rhythm at 106 bpm, QTc = 422  Tachycardic to 122 initially in the ED  BP elevated to 162/90 upon initial evaluation  No chest pain, palpitations, shortness of breath, headache, dizziness  - CTA of 01/15/2025 negative for PE  - TTE significant for trace regurgitation , repeat trop ordered   Continues to deny chest pain  - c/w lovenox full dose  Cardio consulted as troponin uptrended overnight.
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
## 2025-01-19 NOTE — CONSULT NOTE ADULT - ASSESSMENT
34-year-old male, with past history significant for DVT of the left lower extremity (diagnosed a few days ago), Anxiety, Depression, Bipolar 1 disorder, and Substance use disorder (fentanyl, xylazine), presented to the ED, after leaving the Hospital AMA on 01/15/2025, secondary to need for further management of DVT, elevated troponin and substance use disorder. He now is found to have concern for pyomyositis and an iliac lesion. His troponins were slightly elevated.       PLAN:  EKG reviewed with NSR, with hyperacute TWaves in V2 and V3 V4 V5 V6  Patient has no chest pain   ECHO is normal LV no wall motion abnormalities  Would not treat for an acute ischemic issue, no need for heparin gtt or an urgent cath  Please consider a Coronary Artery CT angiogram if patient is willing to do this test, it is less invasive given his age  If patient has any chest pain or other symptoms, please call cardiology  Can continue to trend the troponin and cardiac enzymes, they have been stable, not rising.    This consult has been reviewed with cardiology fellow, Dr. Young Toussaint.
Patient is a 34 year old male with PMH of  bipolar disorder, anxiety, depression, history of drug use (fentanyl and xyalzine- last use 12/23 per patient; no hx of IV drug use), recent ER visit where he was diagnosed with LLE DVT and left AMA prior to cardiology workup for elevated troponin who returns to the ER for  further management of DVT, elevated troponin and substance use disorder. Patient states he has been with increased left lower leg pain for some time now associated with increased difficulty walking; attributes to use of xylazine. No systenmic sx. Vitals on admission notable for tachycardia and labs with leukocytosis to 11.10, , troponin elevated to 242, and utox positive for THC and fentanyl. Recent duplex of of b/l lower extremities showed Heterogeneous enlarged appearance of what appears to be left distal thigh musculature for which MRI was recommended for further evaluation. MRI of LS spine also ordered in setting of decreased strength of the LLE in comparison to the RLE.  Imaging as follows:     MRI of the L spine:  Mild degenerative changes. Lesion involving the right iliac bone - “There is abnormal decreased signal seen involving the right iliac bone. This finding does appear to demonstrate subtle enhancement. Please note the possibility of underlying lesion such as metastasis or other similar etiology must be considered. The possibility of a bone island must also  be considered. CT scan of the bony pelvis can be done to better evaluate this finding.     MRI of Left femur: There is multifocal regions of muscular edema and enhancement involving the left thigh musculature. There is focal edema and  enhancement within the quadratus femoris. There is mild edema and enhancement at the proximal semimembranosus, semitendinosus, and biceps  femoris musculature. There is focal edema and enhancement within the distal vastus lateralis and vastus medialis muscles. Focal muscular edema noted within the medial and lateral aspects of the gluteus jane.  Partially imaged edema and enhancement within the gluteus medius muscle. Suspected small rim-enhancing fluid collection within the short head of  the biceps femoris and the distal semimembranosus muscle. These  collections measure approximately 2 x 0.7 x 4 cm and 1 x 0.5 x 6.2 cm. Differential considerations include myositis of infectious, ischemic, and/or inflammatory etiology.       On clinical exam, patient with decreased strength of the LLE and decrease ROM at the left hip.     #edema and enhancement on CT imaging of left thigh with suspected small rim enhancing fluid collections  #elevated CK  #xylazine use   -differential is broad for CT imaging findings however myositis has been associated with xylazine use   -no systemic signs and sx to suggest an infectious myositis at this time   -check 2 sets of blood cultures and HIV screen  -if sx not improving, may need to consider serial imaging and drainage   -monitor off abx for now  -f/u all culture data  -monitor WBC and fever curve     Case seen and discussed with Dr. Mcbride who agrees with assessment and plan. Note not final until attending addendum. 
Primary Defect Width (In Cm): 0

## 2025-01-19 NOTE — BH CONSULTATION LIAISON PROGRESS NOTE - NSBHFUPINTERVALHXFT_PSY_A_CORE
Pt seen for f/u bipolar disorder and polysubstance use. Pt started on zyprexa 5 mg yesterday, no PRNs needed.    On evaluation today, pt is cooperative although irritable. He reports wanting to go home, says the team "keeps lying to me about discharge" and "probing me." Concern for sx of psychosis as pt additionally says "I think they put a chip in me." Pt denying AVH currently, says last was couple days ago when he thought he was having a conversation with his mom. Reports fair mood and sleep. Ate breakfast this morning. Denies Si/HI.

## 2025-01-19 NOTE — BH CONSULTATION LIAISON PROGRESS NOTE - NSBHCONSULTFOLLOWAFTERCARE_PSY_A_CORE FT
CL will follow, please encourage patient to allow for medical care - can call sister to help encourage patient to stay and receive care   Sister with no acute psychiatric safety concerns at this time - states patient is trying to get his life back together - he has a daughter he wants to see and live for.     Medicine team + SBIRT to ensure patient has a f/u appt with Addiction recovery services for continued Suboxone treatment

## 2025-01-19 NOTE — PROGRESS NOTE ADULT - ASSESSMENT
34-year-old male, with past history significant for DVT of the left lower extremity (diagnosed a few days ago), Anxiety, Depression, Bipolar 1 disorder, and Substance use disorder (fentanyl, xylazine), presented to the ED, after leaving the Hospital AMA on 01/24/2025, secondary to need for further management of DVT, elevated troponin and substance use disorder.  Diagnosed with Acute Deep Vein Thrombosis of Lower Extremity, Elevated Troponin and Substance Use Disorder in the ED. Was transitioned from Heparin GGT to therapeutic lovenox. Cardio consulted, reports no further intervention given normal TTE, will obtain repeat trop and follow up in the AM to inquire about indication for possible Cardiac MRI. MRI done significant for inflammation and small fluid collection. ID consulted, can monitor antibiotics for now. Psych recs appreciated.  34-year-old male, with past history significant for DVT of the left lower extremity (diagnosed a few days ago), Anxiety, Depression, Bipolar 1 disorder, and Substance use disorder (fentanyl, xylazine), presented to the ED, after leaving the Hospital AMA on 01/24/2025, secondary to need for further management of DVT, elevated troponin and substance use disorder.  Diagnosed with Acute Deep Vein Thrombosis of Lower Extremity, Elevated Troponin and Substance Use Disorder in the ED. Was transitioned from Heparin GGT to therapeutic lovenox. Cardio consulted, reports no further intervention given normal TTE, will obtain repeat trop and follow up in the AM to inquire about indication for possible Cardiac MRI. MRI done significant for inflammation and small fluid collection. ID consulted, can monitor antibiotics for now. Psych recs appreciated. Cardio f/u for troponemia

## 2025-01-19 NOTE — PROVIDER CONTACT NOTE (OTHER) - ASSESSMENT
Pt A&0x4 refusing tele monitoring , lab , blood cultures very agitated in the unit requesting to leave
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
## 2025-01-19 NOTE — PROVIDER CONTACT NOTE (OTHER) - BACKGROUND
34 M hx of anxiety depression bipolar 1 substance use disorder present to ED after leaving AMA with known dx of DVT

## 2025-01-19 NOTE — CONSULT NOTE ADULT - REASON FOR ADMISSION
Acute deep vein thrombosis of lower extremity, Elevated troponin, Substance use disorder
Acute deep vein thrombosis of lower extremity, Elevated troponin, Substance use disorder

## 2025-01-19 NOTE — PROGRESS NOTE ADULT - SUBJECTIVE AND OBJECTIVE BOX
PROGRESS NOTE:     Patient is a 34y old  Male who presents with a chief complaint of Acute deep vein thrombosis of lower extremity, Elevated troponin, Substance use disorder (19 Jan 2025 06:57)      SUBJECTIVE / OVERNIGHT EVENTS:    ADDITIONAL REVIEW OF SYSTEMS:    MEDICATIONS  (STANDING):  enoxaparin Injectable 70 milliGRAM(s) SubCutaneous every 12 hours  nicotine - 21 mG/24Hr(s) Patch 1 Patch Transdermal daily  OLANZapine 5 milliGRAM(s) Oral at bedtime    MEDICATIONS  (PRN):  acetaminophen     Tablet .. 650 milliGRAM(s) Oral every 6 hours PRN Mild Pain (1 - 3)  ketorolac   Injectable 30 milliGRAM(s) IV Push every 6 hours PRN Moderate Pain (4 - 6) or Severe Pain (7 - 10)  melatonin 3 milliGRAM(s) Oral at bedtime PRN Insomnia  sodium chloride 0.65% Nasal 2 Spray(s) Both Nostrils four times a day PRN Nasal Congestion      CAPILLARY BLOOD GLUCOSE        I&O's Summary      PHYSICAL EXAM:  Vital Signs Last 24 Hrs  T(C): 36.7 (19 Jan 2025 05:22), Max: 36.7 (19 Jan 2025 05:22)  T(F): 98.1 (19 Jan 2025 05:22), Max: 98.1 (19 Jan 2025 05:22)  HR: 72 (19 Jan 2025 05:22) (72 - 97)  BP: 134/93 (19 Jan 2025 05:22) (134/93 - 137/90)  BP(mean): --  RR: 18 (19 Jan 2025 05:22) (17 - 18)  SpO2: 99% (19 Jan 2025 05:22) (99% - 100%)    Parameters below as of 19 Jan 2025 05:22  Patient On (Oxygen Delivery Method): room air        CONSTITUTIONAL: NAD, well-developed  RESPIRATORY: Normal respiratory effort; lungs are clear to auscultation bilaterally  CARDIOVASCULAR: Regular rate and rhythm, normal S1 and S2, no murmur/rub/gallop; No lower extremity edema; Peripheral pulses are 2+ bilaterally  ABDOMEN: Nontender to palpation, normoactive bowel sounds, no rebound/guarding; No hepatosplenomegaly  MUSCLOSKELETAL: no clubbing or cyanosis of digits; no joint swelling or tenderness to palpation  PSYCH: A+O to person, place, and time; affect appropriate  NEURO:  SKIN:    LABS:                        10.5   7.26  )-----------( 518      ( 19 Jan 2025 05:53 )             32.1     01-19    138  |  103  |  13  ----------------------------<  82  3.7   |  23  |  0.69    Ca    8.7      19 Jan 2025 05:53  Phos  4.2     01-19  Mg     2.00     01-19      PT/INR - ( 18 Jan 2025 05:40 )   PT: 12.7 sec;   INR: 1.07 ratio         PTT - ( 18 Jan 2025 05:40 )  PTT:32.8 sec  CARDIAC MARKERS ( 19 Jan 2025 02:49 )  x     / x     / x     / x     / 38.1 ng/mL      Urinalysis Basic - ( 19 Jan 2025 05:53 )    Color: x / Appearance: x / SG: x / pH: x  Gluc: 82 mg/dL / Ketone: x  / Bili: x / Urobili: x   Blood: x / Protein: x / Nitrite: x   Leuk Esterase: x / RBC: x / WBC x   Sq Epi: x / Non Sq Epi: x / Bacteria: x          RADIOLOGY & ADDITIONAL TESTS:  Results Reviewed:   Imaging Personally Reviewed:  Electrocardiogram Personally Reviewed:    COORDINATION OF CARE:  Care Discussed with Consultants/Other Providers [Y/N]:  Prior or Outpatient Records Reviewed [Y/N]:   PROGRESS NOTE:     Patient is a 34y old  Male who presents with a chief complaint of Acute deep vein thrombosis of lower extremity, Elevated troponin, Substance use disorder (19 Jan 2025 06:57)      SUBJECTIVE / OVERNIGHT EVENTS:  Overnight, CCU consulted for uptrending troponin. Patient continues to deny chest pain.   Today patient examined at bedside, very irritable screaming at MD, ACP and RN that he wants to leave, but refuses to sign AMA form.   He understands the risks associated with leaving and benefits associated with treatment but keeps repeating "you guys just want me to stay here so you can find a bunch of stuff".  After MD, ACP RN and SW meet with patient with his mother patient agreed to stay for further workup.    ADDITIONAL REVIEW OF SYSTEMS:    MEDICATIONS  (STANDING):  enoxaparin Injectable 70 milliGRAM(s) SubCutaneous every 12 hours  nicotine - 21 mG/24Hr(s) Patch 1 Patch Transdermal daily  OLANZapine 5 milliGRAM(s) Oral at bedtime    MEDICATIONS  (PRN):  acetaminophen     Tablet .. 650 milliGRAM(s) Oral every 6 hours PRN Mild Pain (1 - 3)  ketorolac   Injectable 30 milliGRAM(s) IV Push every 6 hours PRN Moderate Pain (4 - 6) or Severe Pain (7 - 10)  melatonin 3 milliGRAM(s) Oral at bedtime PRN Insomnia  sodium chloride 0.65% Nasal 2 Spray(s) Both Nostrils four times a day PRN Nasal Congestion      CAPILLARY BLOOD GLUCOSE        I&O's Summary      PHYSICAL EXAM:  Vital Signs Last 24 Hrs  T(C): 36.7 (19 Jan 2025 05:22), Max: 36.7 (19 Jan 2025 05:22)  T(F): 98.1 (19 Jan 2025 05:22), Max: 98.1 (19 Jan 2025 05:22)  HR: 72 (19 Jan 2025 05:22) (72 - 97)  BP: 134/93 (19 Jan 2025 05:22) (134/93 - 137/90)  BP(mean): --  RR: 18 (19 Jan 2025 05:22) (17 - 18)  SpO2: 99% (19 Jan 2025 05:22) (99% - 100%)    Parameters below as of 19 Jan 2025 05:22  Patient On (Oxygen Delivery Method): room air        CONSTITUTIONAL: NAD, well-developed  RESPIRATORY: Normal respiratory effort; lungs are clear to auscultation bilaterally  CARDIOVASCULAR: Regular rate and rhythm, normal S1 and S2, no murmur/rub/gallop; No lower extremity edema; Peripheral pulses are 2+ bilaterally  ABDOMEN: Nontender to palpation, normoactive bowel sounds, no rebound/guarding; No hepatosplenomegaly  MUSCLOSKELETAL: no clubbing or cyanosis of digits; no joint swelling or tenderness to palpation  PSYCH: A+O to person, place, and time; affect - irritable.      LABS:                        10.5   7.26  )-----------( 518      ( 19 Jan 2025 05:53 )             32.1     01-19    138  |  103  |  13  ----------------------------<  82  3.7   |  23  |  0.69    Ca    8.7      19 Jan 2025 05:53  Phos  4.2     01-19  Mg     2.00     01-19      PT/INR - ( 18 Jan 2025 05:40 )   PT: 12.7 sec;   INR: 1.07 ratio         PTT - ( 18 Jan 2025 05:40 )  PTT:32.8 sec  CARDIAC MARKERS ( 19 Jan 2025 02:49 )  x     / x     / x     / x     / 38.1 ng/mL      Urinalysis Basic - ( 19 Jan 2025 05:53 )    Color: x / Appearance: x / SG: x / pH: x  Gluc: 82 mg/dL / Ketone: x  / Bili: x / Urobili: x   Blood: x / Protein: x / Nitrite: x   Leuk Esterase: x / RBC: x / WBC x   Sq Epi: x / Non Sq Epi: x / Bacteria: x          RADIOLOGY & ADDITIONAL TESTS:  Results Reviewed:   Imaging Personally Reviewed:  Electrocardiogram Personally Reviewed:    COORDINATION OF CARE:  Care Discussed with Consultants/Other Providers [Y/N]:  Prior or Outpatient Records Reviewed [Y/N]:

## 2025-01-19 NOTE — CHART NOTE - NSCHARTNOTEFT_GEN_A_CORE
34-year-old male, with past history significant for DVT of the left lower extremity (diagnosed a few days ago), Anxiety, Depression, Bipolar 1 disorder, and Substance use disorder (fentanyl, xylazine), presented to the ED, after leaving the Hospital AMA on 01/24/2025. Patient currently on Lovenox 70 mg BID for LLE DVT.     EKG was done as requested by behavioral health, which showed some questionable underlying Hyperacute T wave elevations vs artifacts. Noted in "Jinny [facility]IEUGENIE ", Cardiology was consulted for Troponemia and EKG findings on 1/15 prior to patient leaving against medical advice.     Patient seen at bedside, he continues to deny any chest pain, palpitations, dizziness, or SOB. Patient with Elevated Troponin since admission last, 335 on 1/18. EKG's discussed with [facility] Dr. JEROME Cee who recommended to get CCU consult. CCU consulted and reviewed EKG's & TTE. Case discussed with CCU NP, Gerry Delong, no concern for acute MI at this time.       Plan:    -Repeat troponin this am with decrease from 335 to 285  -Creatine Kinase 436 however specimen is moderately hemolyzed  -TTE  with normal LV function   -Continue telemetry monitoring  -Continue Full dose Lovenox 70 mg BID  -Follow up cardiology consult this morning. 34-year-old male, with past history significant for DVT of the left lower extremity (diagnosed a few days ago), Anxiety, Depression, Bipolar 1 disorder, and Substance use disorder (fentanyl, xylazine), presented to the ED, after leaving the Hospital AMA on 01/24/2025. Patient currently on Lovenox 70 mg BID for LLE DVT.     EKG was done as requested by behavioral health, which showed some questionable underlying Hyperacute T wave elevations vs artifacts. Noted in "[facility]inny [facility]IE[facility] ", Cardiology was consulted for Troponemia and EKG findings on 1/15 prior to patient leaving against medical advice.     Patient seen at bedside, he continues to deny any chest pain, palpitations, dizziness, or SOB. Patient with Elevated Troponin since admission last, 335 on 1/18. Case including EKG's discussed with [facility] Dr. JEROME Cee who recommended to get CCU consult. CCU consulted and reviewed EKG's & TTE. Case discussed with CCU NP, Gerry Delong, no concern for acute MI at this time.       Plan:    -Repeat troponin this am with decrease from 335 to 285  -Creatine Kinase 436 however specimen is moderately hemolyzed, (informed by RN patient refusing to do any more labs for now).   -TTE  with normal LV function   -Continue telemetry monitoring  -Continue Full dose Lovenox 70 mg BID  -Follow up cardiology consult this morning.

## 2025-01-19 NOTE — CONSULT NOTE ADULT - ATTENDING COMMENTS
34 year old male with a history of bipolar disorder  H/o polysubstance abuse including xylazine    He presents with lower extremity pain    Imaging raises concern for myositis    Differential of myosotis is broad.    Typically, pyomyositis is associated with blood stream infections.  He has been afebrile since presentation.  He has a normal WBC.  ECHO without obvious vegetation    Clinical syndorme not suggestive of other infectious causes of myositis (HTLV, coxsackie, cryptococcus, strep, influenza, trichinosis, cysticercosis)    Observe off antibiotics    Xyalzine has been associated soft tissue injuries- including myositis  Aside from direct toxicity, could it predispose to myonecrosis?   https://pmc.ncbi.nlm.nih.gov/articles/HOD92225953/pdf/ZOY0-0-b32359.pdf    Further work up of iliac lesion per primary team    Check HIV status  Check two sets of blood cultures    Next step in ID work up would be drainage of collections for culture.  The collections may be too small.    Overall,   Differential remains broad  Bacterial process seems less likely  If not improving- serial imaging and evaluation for drainage  Observe off antibiotics    Call ID service with questions
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
## 2025-01-20 LAB
HCT VFR BLD CALC: 32.9 % — LOW (ref 39–50)
HGB BLD-MCNC: 10.8 G/DL — LOW (ref 13–17)
MCHC RBC-ENTMCNC: 27.3 PG — SIGNIFICANT CHANGE UP (ref 27–34)
MCHC RBC-ENTMCNC: 32.8 G/DL — SIGNIFICANT CHANGE UP (ref 32–36)
MCV RBC AUTO: 83.1 FL — SIGNIFICANT CHANGE UP (ref 80–100)
NRBC # BLD AUTO: 0 K/UL — SIGNIFICANT CHANGE UP (ref 0–0)
NRBC # BLD: 0 /100 WBCS — SIGNIFICANT CHANGE UP (ref 0–0)
NRBC # FLD: 0 K/UL — SIGNIFICANT CHANGE UP (ref 0–0)
NRBC BLD-RTO: 0 /100 WBCS — SIGNIFICANT CHANGE UP (ref 0–0)
PLATELET # BLD AUTO: 511 K/UL — HIGH (ref 150–400)
RBC # BLD: 3.96 M/UL — LOW (ref 4.2–5.8)
RBC # FLD: 16.5 % — HIGH (ref 10.3–14.5)
WBC # BLD: 7.84 K/UL — SIGNIFICANT CHANGE UP (ref 3.8–10.5)
WBC # FLD AUTO: 7.84 K/UL — SIGNIFICANT CHANGE UP (ref 3.8–10.5)

## 2025-01-20 PROCEDURE — 99233 SBSQ HOSP IP/OBS HIGH 50: CPT

## 2025-01-20 RX ORDER — OLANZAPINE 10 MG/1
10 TABLET, FILM COATED ORAL AT BEDTIME
Refills: 0 | Status: DISCONTINUED | OUTPATIENT
Start: 2025-01-20 | End: 2025-01-21

## 2025-01-20 RX ORDER — BUPRENORPHINE AND NALOXONE 8; 2 MG/1; MG/1
1 FILM BUCCAL; SUBLINGUAL DAILY
Refills: 0 | Status: DISCONTINUED | OUTPATIENT
Start: 2025-01-21 | End: 2025-01-21

## 2025-01-20 RX ORDER — BUPRENORPHINE AND NALOXONE 8; 2 MG/1; MG/1
1 FILM BUCCAL; SUBLINGUAL ONCE
Refills: 0 | Status: DISCONTINUED | OUTPATIENT
Start: 2025-01-20 | End: 2025-01-20

## 2025-01-20 RX ADMIN — BUPRENORPHINE AND NALOXONE 1 TABLET(S): 8; 2 FILM BUCCAL; SUBLINGUAL at 16:05

## 2025-01-20 RX ADMIN — Medication 50000 UNIT(S): at 16:04

## 2025-01-20 RX ADMIN — OLANZAPINE 10 MILLIGRAM(S): 10 TABLET, FILM COATED ORAL at 21:37

## 2025-01-20 RX ADMIN — ENOXAPARIN SODIUM 70 MILLIGRAM(S): 100 INJECTION SUBCUTANEOUS at 17:08

## 2025-01-20 RX ADMIN — ENOXAPARIN SODIUM 70 MILLIGRAM(S): 100 INJECTION SUBCUTANEOUS at 05:15

## 2025-01-20 NOTE — PROGRESS NOTE ADULT - PROBLEM SELECTOR PLAN 9
History of Anxiety, Depression, Bipolar disorder  Complicated by insomnia (comments on extreme sleep difficulty)  Not on medications PTA  No SI, HI, AH, VH presently  - f/u TSH and vitamin D levels in the AM  - Psychiatry consult pending recs History of Anxiety, Depression, Bipolar disorder  Complicated by insomnia (comments on extreme sleep difficulty)  Not on medications PTA  No SI, HI, AH, VH presently  - f/u TSH and vitamin D levels in the AM  - Psychiatry consulted will continue with zyprexa qhs to increase 10mg qhs

## 2025-01-20 NOTE — PROGRESS NOTE ADULT - ASSESSMENT
34-year-old male, with past history significant for DVT of the left lower extremity (diagnosed a few days ago), Anxiety, Depression, Bipolar 1 disorder, and Substance use disorder (fentanyl, xylazine), presented to the ED, after leaving the Hospital AMA on 01/24/2025, secondary to need for further management of DVT, elevated troponin and substance use disorder.  Diagnosed with Acute Deep Vein Thrombosis of Lower Extremity, Elevated Troponin and Substance Use Disorder in the ED. Was transitioned from Heparin GGT to therapeutic lovenox. Cardio consulted, reports no further intervention given normal TTE, will obtain repeat trop and follow up in the AM to inquire about indication for possible Cardiac MRI. MRI done significant for inflammation and small fluid collection. ID consulted, can monitor antibiotics for now. Psych recs appreciated. Cardio f/u for troponemia 34-year-old male, with past history significant for DVT of the left lower extremity (diagnosed a few days ago), Anxiety, Depression, Bipolar 1 disorder, and Substance use disorder (fentanyl, xylazine), presented to the ED, after leaving the Hospital AMA on 01/24/2025, secondary to need for further management of DVT, elevated troponin and substance use disorder.  Diagnosed with Acute Deep Vein Thrombosis of Lower Extremity, Elevated Troponin and Substance Use Disorder in the ED. Was transitioned from Heparin GGT to therapeutic lovenox. Cardio consulted, reports no further intervention given normal TTE, will obtain repeat trop and follow up in the AM to inquire about indication for possible Cardiac MRI. MRI done significant for inflammation and small fluid collection. ID consulted, can monitor antibiotics for now as likely not infectious and secondary to xylazine use. Psych recs appreciated. Cardio f/u for troponemia, recommeding CT Coronaries.

## 2025-01-20 NOTE — PROGRESS NOTE ADULT - PROBLEM SELECTOR PLAN 2
Troponin currently 242 (was > 335 on 01/15/2025).  CK = 380, CKMB = 46, CPK Mass assay = 124  ECG = sinus rhythm at 106 bpm, QTc = 422  Tachycardic to 122 initially in the ED  BP elevated to 162/90 upon initial evaluation  No chest pain, palpitations, shortness of breath, headache, dizziness  - CTA of 01/15/2025 negative for PE  - TTE significant for trace regurgitation , repeat trop ordered   Continues to deny chest pain  - c/w lovenox full dose  Cardio consulted as troponin uptrended overnight.

## 2025-01-20 NOTE — BH CONSULTATION LIAISON PROGRESS NOTE - NSBHFUPINTERVALHXFT_PSY_A_CORE
Pt seen for f/u bipolar disorder and polysubstance use. Pt compliant with HS dose of zyprexa 5 mg. Overnight, no PRNs needed.    On exam, patient is irritable and minimally cooperative. Patient is discharge-focused, states that team repeatedly has told him that "y'all need keep me for another day". Patient endorses vague paranoia towards staff, as he states he believes staff "keeps poking me trying to correct their mistakes". Describes mood, sleep and appetite as "ok". Continues to endorses cravings on current dose of suboxone. Patient reports that he "just need to be placed back on my medication and I'll be fine". Reports that he had previously been on several medications, including Seroquel, Remeron, gabapentin, and Wellbutrin, [facility] Patient becomes increasingly irritable when discussing dispo planning, places cover over face, and terminates the interview. Patient refuses to answer additional questions.

## 2025-01-20 NOTE — PROGRESS NOTE ADULT - SUBJECTIVE AND OBJECTIVE BOX
PROGRESS NOTE:     Patient is a 34y old  Male who presents with a chief complaint of Acute deep vein thrombosis of lower extremity, Elevated troponin, Substance use disorder (19 Jan 2025 16:52)      SUBJECTIVE / OVERNIGHT EVENTS:    ADDITIONAL REVIEW OF SYSTEMS:    MEDICATIONS  (STANDING):  enoxaparin Injectable 70 milliGRAM(s) SubCutaneous every 12 hours  ergocalciferol 00506 Unit(s) Oral <User Schedule>  nicotine - 21 mG/24Hr(s) Patch 1 Patch Transdermal daily  OLANZapine 5 milliGRAM(s) Oral at bedtime    MEDICATIONS  (PRN):  acetaminophen     Tablet .. 650 milliGRAM(s) Oral every 6 hours PRN Mild Pain (1 - 3)  ketorolac   Injectable 30 milliGRAM(s) IV Push every 6 hours PRN Moderate Pain (4 - 6) or Severe Pain (7 - 10)  melatonin 3 milliGRAM(s) Oral at bedtime PRN Insomnia  OLANZapine 5 milliGRAM(s) Oral every 6 hours PRN Agitation  QUEtiapine 25 milliGRAM(s) Oral every 6 hours PRN Anxiety  sodium chloride 0.65% Nasal 2 Spray(s) Both Nostrils four times a day PRN Nasal Congestion      CAPILLARY BLOOD GLUCOSE        I&O's Summary    19 Jan 2025 07:01  -  20 Jan 2025 07:00  --------------------------------------------------------  IN: 0 mL / OUT: 300 mL / NET: -300 mL        PHYSICAL EXAM:  Vital Signs Last 24 Hrs  T(C): 36.8 (20 Jan 2025 05:15), Max: 36.8 (20 Jan 2025 05:15)  T(F): 98.2 (20 Jan 2025 05:15), Max: 98.2 (20 Jan 2025 05:15)  HR: 84 (20 Jan 2025 05:15) (83 - 108)  BP: 121/71 (20 Jan 2025 05:15) (121/71 - 139/91)  BP(mean): --  RR: 18 (20 Jan 2025 05:15) (18 - 18)  SpO2: 99% (20 Jan 2025 05:15) (98% - 100%)    Parameters below as of 20 Jan 2025 05:15  Patient On (Oxygen Delivery Method): room air        CONSTITUTIONAL: NAD, well-developed  RESPIRATORY: Normal respiratory effort; lungs are clear to auscultation bilaterally  CARDIOVASCULAR: Regular rate and rhythm, normal S1 and S2, no murmur/rub/gallop; No lower extremity edema; Peripheral pulses are 2+ bilaterally  ABDOMEN: Nontender to palpation, normoactive bowel sounds, no rebound/guarding; No hepatosplenomegaly  MUSCLOSKELETAL: no clubbing or cyanosis of digits; no joint swelling or tenderness to palpation  PSYCH: A+O to person, place, and time; affect appropriate  NEURO:  SKIN:    LABS:                        10.8   7.84  )-----------( 511      ( 20 Jan 2025 05:50 )             32.9     01-19    138  |  103  |  13  ----------------------------<  82  3.7   |  23  |  0.69    Ca    8.7      19 Jan 2025 05:53  Phos  4.2     01-19  Mg     2.00     01-19        CARDIAC MARKERS ( 19 Jan 2025 02:49 )  x     / x     / x     / x     / 38.1 ng/mL      Urinalysis Basic - ( 19 Jan 2025 05:53 )    Color: x / Appearance: x / SG: x / pH: x  Gluc: 82 mg/dL / Ketone: x  / Bili: x / Urobili: x   Blood: x / Protein: x / Nitrite: x   Leuk Esterase: x / RBC: x / WBC x   Sq Epi: x / Non Sq Epi: x / Bacteria: x          RADIOLOGY & ADDITIONAL TESTS:  Results Reviewed:   Imaging Personally Reviewed:  Electrocardiogram Personally Reviewed:    COORDINATION OF CARE:  Care Discussed with Consultants/Other Providers [Y/N]:  Prior or Outpatient Records Reviewed [Y/N]:   PROGRESS NOTE:     Patient is a 34y old  Male who presents with a chief complaint of Acute deep vein thrombosis of lower extremity, Elevated troponin, Substance use disorder (19 Jan 2025 16:52)      SUBJECTIVE / OVERNIGHT EVENTS:  NAEON. Afebrile. Patient examined at bedside, reports that he will live with sisters on discharge.  Spoke with ID, myositis likely due to xylazine use, does not appear to be infectious in etiology      ADDITIONAL REVIEW OF SYSTEMS:    MEDICATIONS  (STANDING):  enoxaparin Injectable 70 milliGRAM(s) SubCutaneous every 12 hours  ergocalciferol 81769 Unit(s) Oral <User Schedule>  nicotine - 21 mG/24Hr(s) Patch 1 Patch Transdermal daily  OLANZapine 5 milliGRAM(s) Oral at bedtime    MEDICATIONS  (PRN):  acetaminophen     Tablet .. 650 milliGRAM(s) Oral every 6 hours PRN Mild Pain (1 - 3)  ketorolac   Injectable 30 milliGRAM(s) IV Push every 6 hours PRN Moderate Pain (4 - 6) or Severe Pain (7 - 10)  melatonin 3 milliGRAM(s) Oral at bedtime PRN Insomnia  OLANZapine 5 milliGRAM(s) Oral every 6 hours PRN Agitation  QUEtiapine 25 milliGRAM(s) Oral every 6 hours PRN Anxiety  sodium chloride 0.65% Nasal 2 Spray(s) Both Nostrils four times a day PRN Nasal Congestion      CAPILLARY BLOOD GLUCOSE        I&O's Summary    19 Jan 2025 07:01  -  20 Jan 2025 07:00  --------------------------------------------------------  IN: 0 mL / OUT: 300 mL / NET: -300 mL        PHYSICAL EXAM:  Vital Signs Last 24 Hrs  T(C): 36.8 (20 Jan 2025 05:15), Max: 36.8 (20 Jan 2025 05:15)  T(F): 98.2 (20 Jan 2025 05:15), Max: 98.2 (20 Jan 2025 05:15)  HR: 84 (20 Jan 2025 05:15) (83 - 108)  BP: 121/71 (20 Jan 2025 05:15) (121/71 - 139/91)  BP(mean): --  RR: 18 (20 Jan 2025 05:15) (18 - 18)  SpO2: 99% (20 Jan 2025 05:15) (98% - 100%)    Parameters below as of 20 Jan 2025 05:15  Patient On (Oxygen Delivery Method): room air        CONSTITUTIONAL: NAD, well-developed  RESPIRATORY: Normal respiratory effort; lungs are clear to auscultation bilaterally  CARDIOVASCULAR: Regular rate and rhythm, normal S1 and S2, no murmur/rub/gallop; No lower extremity edema; Peripheral pulses are 2+ bilaterally  ABDOMEN: Nontender to palpation, normoactive bowel sounds, no rebound/guarding; No hepatosplenomegaly  MUSCLOSKELETAL: no clubbing or cyanosis of digits; no joint swelling or tenderness to palpation  PSYCH: A+O to person, place, and time; affect irritable       LABS:                        10.8   7.84  )-----------( 511      ( 20 Jan 2025 05:50 )             32.9     01-19    138  |  103  |  13  ----------------------------<  82  3.7   |  23  |  0.69    Ca    8.7      19 Jan 2025 05:53  Phos  4.2     01-19  Mg     2.00     01-19        CARDIAC MARKERS ( 19 Jan 2025 02:49 )  x     / x     / x     / x     / 38.1 ng/mL      Urinalysis Basic - ( 19 Jan 2025 05:53 )    Color: x / Appearance: x / SG: x / pH: x  Gluc: 82 mg/dL / Ketone: x  / Bili: x / Urobili: x   Blood: x / Protein: x / Nitrite: x   Leuk Esterase: x / RBC: x / WBC x   Sq Epi: x / Non Sq Epi: x / Bacteria: x          RADIOLOGY & ADDITIONAL TESTS:  Results Reviewed:   Imaging Personally Reviewed:  Electrocardiogram Personally Reviewed:    COORDINATION OF CARE:  Care Discussed with Consultants/Other Providers [Y/N]:  Prior or Outpatient Records Reviewed [Y/N]:

## 2025-01-20 NOTE — PROGRESS NOTE ADULT - PROBLEM SELECTOR PLAN 1
Presented with pain and swelling of bilateral lower extremities, with L > R  US of 01/15/2025 with finding of "Acute deep venous thrombosis: below the knee.  Nonocclusive thrombus in the left gastrocnemius vein..."  Had left AMA on the prior day and now returns for treatment  - heparin drip stopped -> Started Lovenox 70 bid (full dose)   - f/u lab-work  - analgesic PRN Presented with pain and swelling of bilateral lower extremities, with L > R  US of 01/15/2025 with finding of "Acute deep venous thrombosis: below the knee.  Nonocclusive thrombus in the left gastrocnemius vein..."  Had left AMA on the prior day and now returns for treatment  - heparin drip stopped -> c/w  Lovenox 70 bid (full dose)   - f/u lab-work  - analgesic PRN
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
## 2025-01-21 VITALS
RESPIRATION RATE: 17 BRPM | TEMPERATURE: 99 F | OXYGEN SATURATION: 99 % | SYSTOLIC BLOOD PRESSURE: 121 MMHG | HEART RATE: 75 BPM | DIASTOLIC BLOOD PRESSURE: 53 MMHG

## 2025-01-21 LAB
ANION GAP SERPL CALC-SCNC: 15 MMOL/L — HIGH (ref 7–14)
BUN SERPL-MCNC: 16 MG/DL — SIGNIFICANT CHANGE UP (ref 7–23)
CALCIUM SERPL-MCNC: 9 MG/DL — SIGNIFICANT CHANGE UP (ref 8.4–10.5)
CHLORIDE SERPL-SCNC: 100 MMOL/L — SIGNIFICANT CHANGE UP (ref 98–107)
CO2 SERPL-SCNC: 21 MMOL/L — LOW (ref 22–31)
CREAT SERPL-MCNC: 0.9 MG/DL — SIGNIFICANT CHANGE UP (ref 0.5–1.3)
EGFR: 114 ML/MIN/1.73M2 — SIGNIFICANT CHANGE UP
GLUCOSE SERPL-MCNC: 99 MG/DL — SIGNIFICANT CHANGE UP (ref 70–99)
HCT VFR BLD CALC: 32.2 % — LOW (ref 39–50)
HGB BLD-MCNC: 10.7 G/DL — LOW (ref 13–17)
MAGNESIUM SERPL-MCNC: 1.8 MG/DL — SIGNIFICANT CHANGE UP (ref 1.6–2.6)
MCHC RBC-ENTMCNC: 27.5 PG — SIGNIFICANT CHANGE UP (ref 27–34)
MCHC RBC-ENTMCNC: 33.2 G/DL — SIGNIFICANT CHANGE UP (ref 32–36)
MCV RBC AUTO: 82.8 FL — SIGNIFICANT CHANGE UP (ref 80–100)
NRBC # BLD AUTO: 0 K/UL — SIGNIFICANT CHANGE UP (ref 0–0)
NRBC # BLD: 0 /100 WBCS — SIGNIFICANT CHANGE UP (ref 0–0)
NRBC # FLD: 0 K/UL — SIGNIFICANT CHANGE UP (ref 0–0)
NRBC BLD-RTO: 0 /100 WBCS — SIGNIFICANT CHANGE UP (ref 0–0)
PHOSPHATE SERPL-MCNC: 4.2 MG/DL — SIGNIFICANT CHANGE UP (ref 2.5–4.5)
PLATELET # BLD AUTO: 444 K/UL — HIGH (ref 150–400)
POTASSIUM SERPL-MCNC: 4 MMOL/L — SIGNIFICANT CHANGE UP (ref 3.5–5.3)
POTASSIUM SERPL-SCNC: 4 MMOL/L — SIGNIFICANT CHANGE UP (ref 3.5–5.3)
PROT S FREE PPP-ACNC: 62 % — LOW (ref 70–130)
RBC # BLD: 3.89 M/UL — LOW (ref 4.2–5.8)
RBC # FLD: 16.3 % — HIGH (ref 10.3–14.5)
SODIUM SERPL-SCNC: 136 MMOL/L — SIGNIFICANT CHANGE UP (ref 135–145)
TROPONIN T, HIGH SENSITIVITY RESULT: 198 NG/L — CRITICAL HIGH
WBC # BLD: 8.86 K/UL — SIGNIFICANT CHANGE UP (ref 3.8–10.5)
WBC # FLD AUTO: 8.86 K/UL — SIGNIFICANT CHANGE UP (ref 3.8–10.5)

## 2025-01-21 PROCEDURE — 99232 SBSQ HOSP IP/OBS MODERATE 35: CPT

## 2025-01-21 PROCEDURE — 99239 HOSP IP/OBS DSCHRG MGMT >30: CPT

## 2025-01-21 PROCEDURE — 99232 SBSQ HOSP IP/OBS MODERATE 35: CPT | Mod: GC

## 2025-01-21 RX ORDER — BUPRENORPHINE AND NALOXONE 8; 2 MG/1; MG/1
1 FILM BUCCAL; SUBLINGUAL
Qty: 1 | Refills: 0
Start: 2025-01-21 | End: 2025-01-30

## 2025-01-21 RX ORDER — NICOTINE POLACRILEX 4 MG/1
1 LOZENGE ORAL
Qty: 14 | Refills: 0
Start: 2025-01-21 | End: 2025-02-03

## 2025-01-21 RX ORDER — APIXABAN 5 MG/1
1 TABLET, FILM COATED ORAL
Qty: 54 | Refills: 0
Start: 2025-01-21 | End: 2025-02-19

## 2025-01-21 RX ORDER — OLANZAPINE 10 MG/1
1 TABLET, FILM COATED ORAL
Qty: 30 | Refills: 0
Start: 2025-01-21 | End: 2025-02-19

## 2025-01-21 RX ADMIN — ENOXAPARIN SODIUM 70 MILLIGRAM(S): 100 INJECTION SUBCUTANEOUS at 04:45

## 2025-01-21 RX ADMIN — BUPRENORPHINE AND NALOXONE 1 TABLET(S): 8; 2 FILM BUCCAL; SUBLINGUAL at 10:21

## 2025-01-21 NOTE — BH CONSULTATION LIAISON PROGRESS NOTE - NSBHCHARTREVIEWVS_PSY_A_CORE FT
Vital Signs Last 24 Hrs  T(C): 36.7 (21 Jan 2025 12:02), Max: 37.1 (20 Jan 2025 19:40)  T(F): 98.1 (21 Jan 2025 12:02), Max: 98.7 (20 Jan 2025 19:40)  HR: 92 (21 Jan 2025 12:02) (87 - 108)  BP: 142/96 (21 Jan 2025 12:02) (126/94 - 142/96)  BP(mean): --  RR: 17 (21 Jan 2025 12:02) (17 - 18)  SpO2: 99% (21 Jan 2025 12:02) (97% - 100%)    Parameters below as of 21 Jan 2025 12:02  Patient On (Oxygen Delivery Method): room air    
Vital Signs Last 24 Hrs  T(C): 36.7 (19 Jan 2025 11:53), Max: 36.7 (19 Jan 2025 05:22)  T(F): 98 (19 Jan 2025 11:53), Max: 98.1 (19 Jan 2025 05:22)  HR: 84 (19 Jan 2025 11:53) (72 - 97)  BP: 127/81 (19 Jan 2025 11:53) (127/81 - 137/90)  BP(mean): --  RR: 18 (19 Jan 2025 11:53) (17 - 18)  SpO2: 98% (19 Jan 2025 11:53) (98% - 100%)    Parameters below as of 19 Jan 2025 11:53  Patient On (Oxygen Delivery Method): room air    
Vital Signs Last 24 Hrs  T(C): 36.8 (20 Jan 2025 05:15), Max: 36.8 (20 Jan 2025 05:15)  T(F): 98.2 (20 Jan 2025 05:15), Max: 98.2 (20 Jan 2025 05:15)  HR: 84 (20 Jan 2025 05:15) (83 - 108)  BP: 121/71 (20 Jan 2025 05:15) (121/71 - 139/91)  BP(mean): --  RR: 18 (20 Jan 2025 05:15) (18 - 18)  SpO2: 99% (20 Jan 2025 05:15) (98% - 100%)    Parameters below as of 20 Jan 2025 05:15  Patient On (Oxygen Delivery Method): room air

## 2025-01-21 NOTE — PROGRESS NOTE ADULT - PROBLEM SELECTOR PLAN 11
Patient reports leaving AMA from the ED on the previous day because of feeling upset that his drug history was being discussed in the open (patient reports being on a stretcher in the hallway at the time)   - optimal discretion when discussing patient's medical care  - continue to encourage compliance with  medical care.

## 2025-01-21 NOTE — PROGRESS NOTE ADULT - PROBLEM/PLAN-6
DISPLAY PLAN FREE TEXT
DISPLAY PLAN FREE TEXT
Yes
DISPLAY PLAN FREE TEXT

## 2025-01-21 NOTE — PROGRESS NOTE ADULT - PROBLEM SELECTOR PROBLEM 7
Epistaxis, recurrent

## 2025-01-21 NOTE — BH CONSULTATION LIAISON PROGRESS NOTE - ATTENDING COMMENTS
Handoff received from Dr. Alcantar, chart reviewed, seen/evaluated with Jenna Monroy NP, agree with above assessment/plan. Patient oriented, cooperative, no evidence of acute psychosis, denies si and hi. Expressing some mild paranoia, suspecting to be in the setting of [facility]. Overall future oriented, treatment seeking. Plan as above. Call with questions

## 2025-01-21 NOTE — BH CONSULTATION LIAISON PROGRESS NOTE - CURRENT MEDICATION
MEDICATIONS  (STANDING):  enoxaparin Injectable 70 milliGRAM(s) SubCutaneous every 12 hours  nicotine - 21 mG/24Hr(s) Patch 1 Patch Transdermal daily  OLANZapine 5 milliGRAM(s) Oral at bedtime    MEDICATIONS  (PRN):  acetaminophen     Tablet .. 650 milliGRAM(s) Oral every 6 hours PRN Mild Pain (1 - 3)  ketorolac   Injectable 30 milliGRAM(s) IV Push every 6 hours PRN Moderate Pain (4 - 6) or Severe Pain (7 - 10)  melatonin 3 milliGRAM(s) Oral at bedtime PRN Insomnia  sodium chloride 0.65% Nasal 2 Spray(s) Both Nostrils four times a day PRN Nasal Congestion  
MEDICATIONS  (STANDING):  enoxaparin Injectable 70 milliGRAM(s) SubCutaneous every 12 hours  ergocalciferol 61535 Unit(s) Oral <User Schedule>  nicotine - 21 mG/24Hr(s) Patch 1 Patch Transdermal daily  OLANZapine 5 milliGRAM(s) Oral at bedtime    MEDICATIONS  (PRN):  acetaminophen     Tablet .. 650 milliGRAM(s) Oral every 6 hours PRN Mild Pain (1 - 3)  ketorolac   Injectable 30 milliGRAM(s) IV Push every 6 hours PRN Moderate Pain (4 - 6) or Severe Pain (7 - 10)  melatonin 3 milliGRAM(s) Oral at bedtime PRN Insomnia  OLANZapine 5 milliGRAM(s) Oral every 6 hours PRN Agitation  QUEtiapine 25 milliGRAM(s) Oral every 6 hours PRN Anxiety  sodium chloride 0.65% Nasal 2 Spray(s) Both Nostrils four times a day PRN Nasal Congestion  
MEDICATIONS  (STANDING):  buprenorphine 8 mG/naloxone 2 mG SL  Tablet 1 Tablet(s) SubLingual daily  enoxaparin Injectable 70 milliGRAM(s) SubCutaneous every 12 hours  ergocalciferol 40583 Unit(s) Oral <User Schedule>  nicotine - 21 mG/24Hr(s) Patch 1 Patch Transdermal daily  OLANZapine 10 milliGRAM(s) Oral at bedtime    MEDICATIONS  (PRN):  acetaminophen     Tablet .. 650 milliGRAM(s) Oral every 6 hours PRN Mild Pain (1 - 3)  ketorolac   Injectable 30 milliGRAM(s) IV Push every 6 hours PRN Moderate Pain (4 - 6) or Severe Pain (7 - 10)  melatonin 3 milliGRAM(s) Oral at bedtime PRN Insomnia  OLANZapine 5 milliGRAM(s) Oral every 6 hours PRN Agitation  QUEtiapine 25 milliGRAM(s) Oral every 6 hours PRN Anxiety  sodium chloride 0.65% Nasal 2 Spray(s) Both Nostrils four times a day PRN Nasal Congestion
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
## 2025-01-21 NOTE — PROGRESS NOTE ADULT - REASON FOR ADMISSION
Acute deep vein thrombosis of lower extremity, Elevated troponin, Substance use disorder

## 2025-01-21 NOTE — PROGRESS NOTE ADULT - PROBLEM SELECTOR PROBLEM 10
Current every day smoker

## 2025-01-21 NOTE — BH CONSULTATION LIAISON PROGRESS NOTE - NSBHCONSULTMEDAGITATION_PSY_A_CORE FT
if qtc < 500, PRN for agitation: Zyprexa 5mg q6hrs
if qtc < 500, PRN for agitation: Zyprexa 5mg q6hrs

## 2025-01-21 NOTE — PROGRESS NOTE ADULT - PROBLEM SELECTOR PROBLEM 3
Swelling of thigh
[Negative] : Heme/Lymph
Swelling of thigh

## 2025-01-21 NOTE — PROGRESS NOTE ADULT - ASSESSMENT
34-year-old male, with past history significant for DVT of the left lower extremity (diagnosed a few days ago), Anxiety, Depression, Bipolar 1 disorder, and Substance use disorder (fentanyl, xylazine), presented to the ED, after leaving the Hospital AMA on 01/24/2025, secondary to need for further management of DVT, elevated troponin and substance use disorder.  Diagnosed with Acute Deep Vein Thrombosis of Lower Extremity and found to have elevated troponin in the ED. Was transitioned from Heparin GGT to therapeutic lovenox.  MRI performed significant for inflammation and small fluid collection. ID consulted, given patient is symptomatically improving will hold off drainage at this time. Psych recs appreciated - plan for patient to establish care with suboxone clinic.

## 2025-01-21 NOTE — DISCHARGE NOTE NURSING/CASE MANAGEMENT/SOCIAL WORK - NSDCCRNAME_GEN_ALL_CORE_FT
[facility] Addiction recovery services program (in [place]) [phone]/ for additional substance treatment resources or assistance with linkage to services -call [facility] SBIRT team [phone] [facility] Addiction recovery services program (in [place]) [phone]/ for additional substance treatment resources or assistance with linkage to services -call [facility] SBIRT team [phone].  [facility] Outpatient services   [facility]  [address] [facility], First Floor, [place] [address]  [phone]   Appointment January 23rd at 9:15am.  Addiction Recovery Services (ARS)  [facility]  [address] [place] [address]  Tel: [phone] (main office)      For additional substance treatment resources or assistance with linkage to services -call [facility] SBIRT team [phone].  [facility] Outpatient services   [facility]  [address] [place] [address], First Floor, [place] [address] [phone]

## 2025-01-21 NOTE — PROGRESS NOTE ADULT - PROBLEM SELECTOR PLAN 1
Presented with pain and swelling of bilateral lower extremities, with L > R  US of 01/15/2025 with finding of "Acute deep venous thrombosis: below the knee.  Nonocclusive thrombus in the left gastrocnemius vein..."  Had left AMA on the prior day and now returns for treatment  - s/p hepatin gtt and therapeutic lovenox, transitioning to eliquis 5mg BID

## 2025-01-21 NOTE — PROGRESS NOTE ADULT - PROBLEM SELECTOR PLAN 6
Hgb = 10.7, stable  Has frequently recurring epistaxis (dark blood, and bright red blood) for the past month, since resolved  f/u iron, TIBC, ferritin  Monitor CBC, maintain active type and screen, transfuse for Hgb<7.0 Hgb = 10.7, stable  Has frequently recurring epistaxis (dark blood, and bright red blood) for the past month, since resolved  iron studies reviewed  Monitor CBC, maintain active type and screen, transfuse for Hgb<7.0

## 2025-01-21 NOTE — BH CONSULTATION LIAISON PROGRESS NOTE - NSBHATTESTTYPEVISIT_PSY_A_CORE
Resident/Fellow with telephonic supervision
On-site Attending supervising ERIN (99XXX codes)
Resident/Fellow with telephonic supervision

## 2025-01-21 NOTE — PHARMACOTHERAPY INTERVENTION NOTE - COMMENTS
Discharge medications reviewed with the patient. Current medication schedule was discussed in detail including: medication name, indication, dose, administration times, treatment duration, side effects, and special instructions. Educated patient on apixaban including the purpose and administration. Discussed dose change from 10mg BID to 5mg BID. Discussed adverse effects in detail and when to seek medical attention (blood in stool, vomit, etc.). Informed patient to notify all providers he is on this and before any planned procedures. Questions and concerns were answered and addressed. Patient demonstrated understanding.  Patient provided with educational handout.    New medications: apixaban, Suboxone, Zyprexa    Ethan SiddiquiD, [facility]  Clinical Pharmacy Specialist  [id number]

## 2025-01-21 NOTE — PROGRESS NOTE ADULT - PROBLEM SELECTOR PLAN 2
Troponin downtrended ->198, appreciate cardiology eval, likely iso myositis, unlikely ACS  ECG = sinus rhythm at 106 bpm, QTc = 422  - CTA of 01/15/2025 negative for PE  - TTE significant for trace regurgitation Troponin downtrended ->198 no need to trend further, appreciate cardiology eval, likely iso myositis, unlikely ACS, patient is presently asymptomatic  ECG = sinus rhythm at 106 bpm, QTc = 422  - CTA of 01/15/2025 negative for PE  - TTE significant for trace regurgitation

## 2025-01-21 NOTE — BH CONSULTATION LIAISON PROGRESS NOTE - NSBHFUPINTERVALHXFT_PSY_A_CORE
Patient was seen and assessed at bedside. patient is awake, alert, calm, allowing for care although seeking medical clearance. Patient states he feels well on zyprexa but does note he has been talking in his sleep. Denies and AH or VH. Does feel he can be paranoid at times and verbalizes this to writer. Aware zyprexa dose was increased and in agreement. He has no SI or HI. Expresses wanting to stay away from substances and be able to see his daughter and not cause any issues with his family. States he cleaned his room of any substances and is hopeful he can stay sober.  Patient was seen and assessed at bedside. patient is awake, alert, calm, allowing for care although seeking medical clearance. Patient states he feels well on zyprexa but does note he has been talking in his sleep. Denies any AH or VH. Denies CAH. Does feel he can be mildly paranoid at times and verbalizes this to writer. Aware zyprexa dose was increased and in agreement. He has no SI or HI. Expresses wanting to stay away from substances and be able to see his daughter and not cause any issues with his family. States he cleaned his room of any substances and is hopeful he can stay sober.

## 2025-01-21 NOTE — PROGRESS NOTE ADULT - PROBLEM SELECTOR PLAN 7
Recurrent, for the past month, at least  Large globs of dark blood, with intermittent bright blood extruded from nose after blowing  Patient states does not take any drug nasally  - on heparin drip for DVT  - prescribing saline nasal spray = 2 sprays QID PRN  - evaluate for recurrence after using sprays  - may benefit from ENT consult.  - No episode currently.
Recurrent, for the past month, at least  Large globs of dark blood, with intermittent bright blood extruded from nose after blowing, since resolved  - patient is on AC for DVT as above  - continue saline nasal spray = 2 sprays QID PRN  - Afrin PRN epistaxis, consider holding AC if S/S bleed
Recurrent, for the past month, at least  Large globs of dark blood, with intermittent bright blood extruded from nose after blowing  Patient states does not take any drug nasally  - on heparin drip for DVT  - prescribing saline nasal spray = 2 sprays QID PRN  - evaluate for recurrence after using sprays  - may benefit from ENT consult.  - No episode currently.
Recurrent, for the past month, at least  Large globs of dark blood, with intermittent bright blood extruded from nose after blowing  Patient states does not take any drug nasally  - on heparin drip for DVT  - prescribing saline nasal spray = 2 sprays QID PRN  - evaluate for recurrence after using sprays  - may benefit from ENT consult.  - No episode currently.
Recurrent, for the past month, at least  Large globs of dark blood, with intermittent bright blood extruded from nose after blowing  Patient states does not take any drug nasally  - on heparin drip for DVT  - prescribing saline nasal spray = 2 sprays QID PRN  - evaluate for recurrence after using sprays  - may benefit from ENT consult.
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
## 2025-01-21 NOTE — PROGRESS NOTE ADULT - PROBLEM SELECTOR PLAN 3
Patient reports being at [facility] due to swelling of B/L LE.  States thigh area significantly swollen at the time, but with lessened edema presently.  US = "heterogeneous enlarged appearance of what appears to be left distal thigh musculature. Cannot exclude mass. ..."  MRI thigh +inflammation and ?fluid collection - ID consulted. Surg follow up AM.
Patient reports being at [facility] due to swelling of B/L LE.  States thigh area significantly swollen at the time, but with lessened edema presently.  US = "heterogeneous enlarged appearance of what appears to be left distal thigh musculature. Cannot exclude mass. ..."  MRI w/ IV contrast recommended; ordering (please f/u)
Patient reports being at [facility] due to swelling of B/L LE.  States thigh area significantly swollen at the time, but with lessened edema presently.  US = "heterogeneous enlarged appearance of what appears to be left distal thigh musculature. Cannot exclude mass. ..."  MRI thigh +inflammation and ?fluid collection - ID consulted. Surg follow up AM.
Patient reports being at [facility] due to swelling of B/L LE.  States thigh area significantly swollen at the time, but with lessened edema presently.  US = "heterogeneous enlarged appearance of what appears to be left distal thigh musculature. Cannot exclude mass. ..."  MRI thigh +inflammation and ?fluid collection - ID consulted. Surg follow up AM.
Patient reports being at [facility] due to swelling of B/L LE.  States thigh area significantly swollen at the time, but with lessened edema presently.  US = "heterogeneous enlarged appearance of what appears to be left distal thigh musculature. Cannot exclude mass. ..."  MRI thigh +inflammation and ?fluid collection - ID consulted.  Per ID: "if sx not improving, may need to consider serial imaging and drainage. monitor off abx for now"  Patient states lower extremity pain has resolved, plan to hold off drainage of collection at this time, patient is in agreement

## 2025-01-21 NOTE — DISCHARGE NOTE PROVIDER - NSDCMRMEDTOKEN_GEN_ALL_CORE_FT
Eliquis Starter Pack for Treatment of DVT and PE 5 mg oral tablet: 1 tab(s) orally 2 times a day Take 2 tablets twice a day x 7 days then take 1 tablet twice a day   buprenorphine-naloxone 8 mg-2 mg sublingual tablet: 1 tab(s) sublingual once a day MDD: 1 tablet  Drisdol 1.25 mg (50,000 intl units) oral capsule: 1 cap(s) orally once a week  Eliquis Starter Pack for Treatment of DVT and PE 5 mg oral tablet: 1 tab(s) orally 2 times a day Take 2 tablets twice a day for 2 more days then take 1 tablet twice a day  nicotine 21 mg/24 hr transdermal film, extended release: 1 film(s) transdermal once a day  OLANZapine 10 mg oral tablet: 1 tab(s) orally once a day (at bedtime)

## 2025-01-21 NOTE — DISCHARGE NOTE NURSING/CASE MANAGEMENT/SOCIAL WORK - FINANCIAL ASSISTANCE
[facility] provides services at a reduced cost to those who are determined to be eligible through [facility]’s financial assistance program. Information regarding [facility]’s financial assistance program can be found by going to [email or web address] or by calling [phone].

## 2025-01-21 NOTE — BH CONSULTATION LIAISON PROGRESS NOTE - NSBHCONSULTFOLLOWAFTERCARE_PSY_A_CORE FT
CL will follow, please encourage patient to allow for medical care - can call sister to help encourage patient to stay and receive care   Sister with no acute psychiatric safety concerns at this time - states patient is trying to get his life back together - he has a daughter he wants to see and live for.     Medicine team + SBIRT to ensure patient has a f/u appt with Addiction recovery services for continued Suboxone treatment  CL will follow, please encourage patient to allow for medical care - can call sister to help encourage patient to stay and receive care   Sister with no acute psychiatric safety concerns at this time - states patient is trying to get his life back together - he has a daughter he wants to see and live for.     Medicine team + SBIRT to ensure patient has a f/u appt with Addiction recovery services for continued Suboxone treatment     Additional Referrals as below:    [facility] Substance Abuse Outpatient Program ([facility]): [phone]      [facility] Project Outreach: [phone]    [facility] Outpatient services  For acute crisis: [facility]  [address] [facility], [address], [place] [address]  [phone]  [email or web address]    [facility] AOPD [phone]

## 2025-01-21 NOTE — PROGRESS NOTE ADULT - SUBJECTIVE AND OBJECTIVE BOX
The patient was seen and examined with the Cardiology Consultation Teaching Service.     No overnight events    No chest pain  No dyspnea, orthopnea or PND  No palpitations or dizziness     Somewhat irritable, asking about necessity of testing    PMH/PSH:  Deep vein thrombosis, left lower extremity   Bipolar disorder  Depression  Anxiety  Substance use disorder  Smoker    Comfortable-appearing man in no acute distress  Alert and oriented  Afebrile  Vital signs stable  JVP is not elevated  Clear lungs  Normal heart sounds  Extremities are warm and perfused  No peripheral edema     Normocytic anemia Hb 10.7  Thrombocytosis 444K      hs-troponin 198, 292, 285, 335, 264, 242, 294, 335  , CK-MB 60; index elevated  pro-, 172    , TG 58, HDL 62;, NHDL 102, LDL 91    Echocardiography demonstrates normal LV systolic function, LVEF 66%. LA size is normal. Mild TR.

## 2025-01-21 NOTE — PROGRESS NOTE ADULT - PROBLEM SELECTOR PLAN 10
Currently at 0.5 ppd after smoking 2 to 3 packs daily since ~ age 7 years old  Trying to quit  Has significant nicotine craving, as reported by patient  - accepts offer for Nicotine patch ; prescribed  - encourage smoking cessation, as tolerated  - would benefit from Social Work consult.
Currently at 0.5 ppd after smoking 2 to 3 packs daily since ~ age 7 years old  Trying to quit  Has significant nicotine craving, as reported by patient  - continue nicotine patch  - encourage smoking cessation, as tolerated

## 2025-01-21 NOTE — PROGRESS NOTE ADULT - PROBLEM SELECTOR PROBLEM 8
Active substance abuse
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
## 2025-01-21 NOTE — BH CONSULTATION LIAISON PROGRESS NOTE - NSBHCHARTREVIEWLAB_PSY_A_CORE FT
10.7   8.86  )-----------( 444      ( 21 Jan 2025 05:56 )             32.2   01-21    136  |  100  |  16  ----------------------------<  99  4.0   |  21[L]  |  0.90    Ca    9.0      21 Jan 2025 05:56  Phos  4.2     01-21  Mg     1.80     01-21      
                      9.9    8.25  )-----------( 505      ( 18 Jan 2025 05:40 )             30.3   01-18    138  |  104  |  14  ----------------------------<  92  3.9   |  24  |  0.70    Ca    8.4      18 Jan 2025 05:40  Phos  4.1     01-18  Mg     1.80     01-18    TPro  7.1  /  Alb  3.7  /  TBili  <0.2  /  DBili  x   /  AST  25  /  ALT  29  /  AlkPhos  107  01-16  
                      9.9    8.25  )-----------( 505      ( 18 Jan 2025 05:40 )             30.3   01-18    138  |  104  |  14  ----------------------------<  92  3.9   |  24  |  0.70    Ca    8.4      18 Jan 2025 05:40  Phos  4.1     01-18  Mg     1.80     01-18    TPro  7.1  /  Alb  3.7  /  TBili  <0.2  /  DBili  x   /  AST  25  /  ALT  29  /  AlkPhos  107  01-16

## 2025-01-21 NOTE — DISCHARGE NOTE NURSING/CASE MANAGEMENT/SOCIAL WORK - PATIENT PORTAL LINK FT
You can access the FollowMyHealth Patient Portal offered by [facility] by registering at the following website: [email or web address]. By joining [employer]’s FollowMyHealth portal, you will also be able to view your health information using other applications (apps) compatible with our system.
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
## 2025-01-21 NOTE — PROGRESS NOTE ADULT - TIME BILLING
The necessity of the time spent during the encounter on this date of service was due to:     - Ordering, reviewing, and interpreting labs, testing, and imaging.  - Independently obtaining a review of systems and performing a physical exam  - Reviewing prior hospitalization and where necessary, outpatient records.  - Counselling and educating patient and family regarding interpretation of aforementioned items and plan of care.
50 minutes of total time dedicated to this patient visit today including preparing to see the patient (eg. review of tests), obtaining and/or reviewing separately obtained history, obtaining/reviewing vitals, performing a medically appropriate examination and/or evaluation, counseling and educating the patient/family/caregiver, reviewing previous notes and test results, and procedures, communicating with other health professionals (when not separately reported), and documenting clinical information in the electronic health record.
The necessity of the time spent during the encounter on this date of service was due to:     - Ordering, reviewing, and interpreting labs, testing, and imaging.  - Independently obtaining a review of systems and performing a physical exam  - Reviewing prior hospitalization and where necessary, outpatient records.  - Counselling and educating patient and family regarding interpretation of aforementioned items and plan of care.
The necessity of the time spent during the encounter on this date of service was due to:     - Ordering, reviewing, and interpreting labs, testing, and imaging.  - Independently obtaining a review of systems and performing a physical exam  - Reviewing prior hospitalization and where necessary, outpatient records.  - Counselling and educating patient and family regarding interpretation of aforementioned items and plan of care.

## 2025-01-21 NOTE — PROGRESS NOTE ADULT - PROBLEM SELECTOR PROBLEM 4
Decreased strength of lower extremity

## 2025-01-21 NOTE — PROGRESS NOTE ADULT - PROBLEM SELECTOR PROBLEM 9
Co-occurrence of multiple psychiatric disorders

## 2025-01-21 NOTE — DISCHARGE NOTE NURSING/CASE MANAGEMENT/SOCIAL WORK - NSDCPEFALRISK_GEN_ALL_CORE
For information on Fall & Injury Prevention, visit: [email or web address] OR  [email or web address] OR  https://www.cdc.gov/steadi/patient.html

## 2025-01-21 NOTE — PROGRESS NOTE ADULT - SUBJECTIVE AND OBJECTIVE BOX
[facility] Division of Hospital Medicine  Ricardo Collins DO  Available via MS Teams  Pager:[phone]    SUBJECTIVE / OVERNIGHT EVENTS: No acute events overnight. Patient states lower extremity pain has resolved. He is not interested in pursuing drainage of collection at this time. No numbness/tingling.    ADDITIONAL REVIEW OF SYSTEMS:  Review of Systems:   CONSTITUTIONAL: No fever, weight loss  EYES: No eye pain, visual disturbances, or discharge  ENMT:  No difficulty hearing, tinnitus, vertigo; No sinus or throat pain  RESPIRATORY: No SOB. No cough, wheezing, chills or hemoptysis  CARDIOVASCULAR: No chest pain, palpitations, dizziness  GASTROINTESTINAL: No abdominal or epigastric pain. No nausea, vomiting, or hematemesis; No diarrhea or constipation. No melena or hematochezia.  GENITOURINARY: No dysuria, frequency, hematuria, or incontinence  NEUROLOGICAL: No headaches, memory loss, loss of strength, numbness, or tremors  SKIN: No itching, burning, rashes, or lesions   LYMPH NODES: No enlarged glands  ENDOCRINE: No heat or cold intolerance; No hair loss  MUSCULOSKELETAL: No joint pain or swelling; No muscle, back pain  HEME/LYMPH: No easy bruising, or bleeding gums      MEDICATIONS  (STANDING):  buprenorphine 8 mG/naloxone 2 mG SL  Tablet 1 Tablet(s) SubLingual daily  enoxaparin Injectable 70 milliGRAM(s) SubCutaneous every 12 hours  ergocalciferol 11576 Unit(s) Oral <User Schedule>  nicotine - 21 mG/24Hr(s) Patch 1 Patch Transdermal daily  OLANZapine 10 milliGRAM(s) Oral at bedtime    MEDICATIONS  (PRN):  acetaminophen     Tablet .. 650 milliGRAM(s) Oral every 6 hours PRN Mild Pain (1 - 3)  ketorolac   Injectable 30 milliGRAM(s) IV Push every 6 hours PRN Moderate Pain (4 - 6) or Severe Pain (7 - 10)  melatonin 3 milliGRAM(s) Oral at bedtime PRN Insomnia  OLANZapine 5 milliGRAM(s) Oral every 6 hours PRN Agitation  QUEtiapine 25 milliGRAM(s) Oral every 6 hours PRN Anxiety  sodium chloride 0.65% Nasal 2 Spray(s) Both Nostrils four times a day PRN Nasal Congestion      I&O's Summary      PHYSICAL EXAM:  Vital Signs Last 24 Hrs  T(C): 36.7 (21 Jan 2025 12:02), Max: 37.1 (20 Jan 2025 19:40)  T(F): 98.1 (21 Jan 2025 12:02), Max: 98.7 (20 Jan 2025 19:40)  HR: 92 (21 Jan 2025 12:02) (92 - 108)  BP: 142/96 (21 Jan 2025 12:02) (126/94 - 142/96)  BP(mean): --  RR: 17 (21 Jan 2025 12:02) (17 - 18)  SpO2: 99% (21 Jan 2025 12:02) (97% - 99%)    Parameters below as of 21 Jan 2025 12:02  Patient On (Oxygen Delivery Method): room air      CONSTITUTIONAL: NAD, well-groomed  EYES: PERRLA; conjunctiva and sclera clear  ENMT: Moist oral mucosa, no pharyngeal injection or exudates; normal dentition  NECK: Supple, no palpable masses; no thyromegaly  RESPIRATORY: Normal respiratory effort; lungs are clear to auscultation bilaterally  CARDIOVASCULAR: Regular rate and rhythm, normal S1 and S2, no murmur/rub/gallop; No lower extremity edema; Peripheral pulses are 2+ bilaterally  ABDOMEN: Nontender to palpation, normoactive bowel sounds, no rebound/guarding; No hepatosplenomegaly  MUSCULOSKELETAL:  No clubbing or cyanosis of digits; no joint swelling or tenderness to palpation, 5/5 strength b/l LE, FROM b/l LE  PSYCH: A+O to person, place, and time; affect appropriate  NEUROLOGY: CN 2-12 are intact and symmetric; no gross sensory deficits   SKIN: No rashes; no palpable lesions    LABS:                        10.7   8.86  )-----------( 444      ( 21 Jan 2025 05:56 )             32.2     01-21    136  |  100  |  16  ----------------------------<  99  4.0   |  21[L]  |  0.90    Ca    9.0      21 Jan 2025 05:56  Phos  4.2     01-21  Mg     1.80     01-21            Urinalysis Basic - ( 21 Jan 2025 05:56 )    Color: x / Appearance: x / SG: x / pH: x  Gluc: 99 mg/dL / Ketone: x  / Bili: x / Urobili: x   Blood: x / Protein: x / Nitrite: x   Leuk Esterase: x / RBC: x / WBC x   Sq Epi: x / Non Sq Epi: x / Bacteria: x        Culture - Blood (collected 19 Jan 2025 19:15)  Source: .Blood BLOOD  Preliminary Report (21 Jan 2025 02:02):    No growth at 24 hours    Culture - Blood (collected 19 Jan 2025 19:10)  Source: .Blood BLOOD  Preliminary Report (21 Jan 2025 02:02):    No growth at 24 hours          RADIOLOGY & ADDITIONAL TESTS:  New Imaging Personally Reviewed Today: Yes  New Electrocardiogram Personally Reviewed Today: Yes  Other Results Reviewed Today: Yes  Prior or Outpatient Records Reviewed Today with Summary: Yes    COORDINATION OF CARE:  Consultant Communication and Details of Discussion (where applicable): Yes            RADIOLOGY & ADDITIONAL TESTS:  New Imaging Personally Reviewed Today: Yes  New Electrocardiogram Personally Reviewed Today: Yes  Other Results Reviewed Today: Yes  Prior or Outpatient Records Reviewed Today with Summary: Yes    COORDINATION OF CARE:  Consultant Communication and Details of Discussion (where applicable): Yes
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
## 2025-01-21 NOTE — BH CONSULTATION LIAISON PROGRESS NOTE - OTHER
expresses having some paranoia  improving  can become irritable  improving expresses having some mild paranoia, no delusions

## 2025-01-21 NOTE — PROGRESS NOTE ADULT - PROBLEM SELECTOR PLAN 9
History of Anxiety, Depression, Bipolar disorder  Complicated by insomnia (comments on extreme sleep difficulty)  Not on medications PTA  No SI, HI, AH, VH presently  - Psychiatry consulted, increased zyprexa to 10mg qHS, appreciate recommendations

## 2025-01-21 NOTE — DISCHARGE NOTE PROVIDER - ATTENDING DISCHARGE PHYSICAL EXAMINATION:
Vital Signs Last 24 Hrs  T(C): 36.7 (21 Jan 2025 12:02), Max: 37.1 (20 Jan 2025 19:40)  T(F): 98.1 (21 Jan 2025 12:02), Max: 98.7 (20 Jan 2025 19:40)  HR: 92 (21 Jan 2025 12:02) (92 - 108)  BP: 142/96 (21 Jan 2025 12:02) (126/94 - 142/96)  BP(mean): --  RR: 17 (21 Jan 2025 12:02) (17 - 18)  SpO2: 99% (21 Jan 2025 12:02) (97% - 99%)    Parameters below as of 21 Jan 2025 12:02  Patient On (Oxygen Delivery Method): room air        CONSTITUTIONAL: Well-groomed, in no apparent distress  EYES: No conjunctival or scleral injection, non-icteric;   ENMT: No external nasal lesions; MMM  NECK: Trachea midline without palpable neck mass; thyroid not enlarged and non-tender  RESPIRATORY: Breathing comfortably; no dullness to percussion; lungs CTA without wheeze/rhonchi/rales  CARDIOVASCULAR: +S1S2, RRR, no M/G/R; pedal pulses full and symmetric; no lower extremity edema  GASTROINTESTINAL: No palpable masses or tenderness, +BS throughout, no rebound/guarding; no hepatosplenomegaly; no hernia palpated  LYMPHATIC: No cervical LAD or tenderness  SKIN: No rashes or ulcers noted  NEUROLOGIC: CN II-XII intact; sensation intact in LEs b/l to light touch  PSYCHIATRIC: A+O x 3; mood and affect appropriate; appropriate insight and judgment

## 2025-01-21 NOTE — DISCHARGE NOTE PROVIDER - NSFOLLOWUPCLINICS_GEN_ALL_ED_FT
[facility] Psych Dept of Substance Abuse  Psychiatry Substance Abuse  [address] [place] [address]  Phone: [phone]  Fax:

## 2025-01-21 NOTE — DISCHARGE NOTE PROVIDER - NSDCFUADDAPPT_GEN_ALL_CORE_FT
[facility] Substance Abuse Outpatient Program ([facility]): [phone]    [facility] Project Outreach: [phone]    [facility] Outpatient services  For acute crisis: [facility]  [address] [place] [address], [address] Floor, [place] [address]  [phone]  [email or web address]

## 2025-01-21 NOTE — DISCHARGE NOTE PROVIDER - NSDCCPCAREPLAN_GEN_ALL_CORE_FT
PRINCIPAL DISCHARGE DIAGNOSIS  Diagnosis: Acute deep vein thrombosis (DVT) of lower extremity  Assessment and Plan of Treatment: Plan to transition to eliquis on discharge BID. Continue for 3-6 months and follow-up with PCP to discuss definitive duration      SECONDARY DISCHARGE DIAGNOSES  Diagnosis: Bone lesion  Assessment and Plan of Treatment: MR showed R illiac bone lesion. Please obtain CT R bony pelvis as outaptient for further evaluation. Please follow-up with PCP for further workup.    Diagnosis: Elevated troponin  Assessment and Plan of Treatment: Cardiology evaluated, per cardiology likely in setting of myositis  Please follow-up with PCP for further workup  Low concern for ACS    Diagnosis: Substance use disorder  Assessment and Plan of Treatment: Psych evaluated and you were started on suboxone, continue suboxone as outpatient, follow-up in suboxone clinic.  Please follow-up:  [facility] [phone]  [facility] Project Outreach: [phone]  [facility] Outpatient services  For acute crisis: [facility]  [address] [place] [address], [facility], [place] [address]  [phone]  [email or web address]  [facility] AOPD [phone]  Patient is scheduled for 1/23 in [facility] clinic      Diagnosis: Abnormal MRI  Assessment and Plan of Treatment: You were found to have a fluid collection and were evaluated by ID. Given symptoms resolved plan to hold off drainage at this time. Please seek urgent medical attention for worsening lower extremity symptoms. Discuss possible drainage with medical provider for worsening/recurrent symptoms.

## 2025-01-21 NOTE — PROGRESS NOTE ADULT - ASSESSMENT
34-year-old man with DVT, chronic psychiatric illness, and substance use disorder who presented with left leg discomfort after being diagnosed with left DVT but leaving against medical advice, found to have edema and ring-enhancing fluid collections of the left leg, suggestive of myositis. He is also incidentally noted to have elevated hs-troponin.    The significance of the patient's elevated hs-troponin is unclear to me, and I am uncertain why they were initially obtained in the first place. The patient has no complaints of chest discomfort or dyspnea. His echocardiography is normal.     I do not believe this represents acute coronary syndrome or myocardial infarction, and I would not treat the patient as such. I suspect that the troponin-elevation is related to whatever myositis process is going on in his left leg.     Although prior cardiac consult notes have discussed possible cardiac CT or MRI, I would not pursue these tests at this time. I would focus on the management of his leg . These tests can be considered if there is a change in his clinical status.     Please reconsult cardiology with additional questions or concerns.    Dwayne Esquivel MD FACC FACP  Cardiology  [phone]

## 2025-01-21 NOTE — PROGRESS NOTE ADULT - PROBLEM SELECTOR PROBLEM 1
Acute deep vein thrombosis (DVT) of left lower extremity

## 2025-01-25 LAB
CULTURE RESULTS: SIGNIFICANT CHANGE UP
CULTURE RESULTS: SIGNIFICANT CHANGE UP
SPECIMEN SOURCE: SIGNIFICANT CHANGE UP
SPECIMEN SOURCE: SIGNIFICANT CHANGE UP

## 2025-02-01 ENCOUNTER — EMERGENCY (EMERGENCY)
Facility: HOSPITAL | Age: 35
LOS: 1 days | Discharge: HOME | End: 2025-02-01
Attending: EMERGENCY MEDICINE | Admitting: EMERGENCY MEDICINE
Payer: MEDICAID

## 2025-02-01 VITALS
OXYGEN SATURATION: 100 % | DIASTOLIC BLOOD PRESSURE: 75 MMHG | WEIGHT: 156.09 LBS | TEMPERATURE: 98 F | HEART RATE: 95 BPM | RESPIRATION RATE: 16 BRPM | HEIGHT: 71 IN | SYSTOLIC BLOOD PRESSURE: 105 MMHG

## 2025-02-01 VITALS
RESPIRATION RATE: 16 BRPM | DIASTOLIC BLOOD PRESSURE: 77 MMHG | TEMPERATURE: 98 F | OXYGEN SATURATION: 96 % | SYSTOLIC BLOOD PRESSURE: 126 MMHG | HEART RATE: 98 BPM

## 2025-02-01 DIAGNOSIS — Z98.890 OTHER SPECIFIED POSTPROCEDURAL STATES: Chronic | ICD-10-CM

## 2025-02-01 LAB
ALBUMIN SERPL ELPH-MCNC: 3.7 G/DL — SIGNIFICANT CHANGE UP (ref 3.3–5)
ALP SERPL-CCNC: 97 U/L — SIGNIFICANT CHANGE UP (ref 40–120)
ALT FLD-CCNC: 16 U/L — SIGNIFICANT CHANGE UP (ref 4–41)
ANION GAP SERPL CALC-SCNC: 12 MMOL/L — SIGNIFICANT CHANGE UP (ref 7–14)
APAP SERPL-MCNC: <10 UG/ML — LOW (ref 15–25)
APTT BLD: 30.3 SEC — SIGNIFICANT CHANGE UP (ref 24.5–35.6)
AST SERPL-CCNC: 36 U/L — SIGNIFICANT CHANGE UP (ref 4–40)
BASOPHILS # BLD AUTO: 0.05 K/UL — SIGNIFICANT CHANGE UP (ref 0–0.2)
BASOPHILS NFR BLD AUTO: 0.5 % — SIGNIFICANT CHANGE UP (ref 0–2)
BILIRUB SERPL-MCNC: <0.2 MG/DL — SIGNIFICANT CHANGE UP (ref 0.2–1.2)
BUN SERPL-MCNC: 12 MG/DL — SIGNIFICANT CHANGE UP (ref 7–23)
CALCIUM SERPL-MCNC: 9 MG/DL — SIGNIFICANT CHANGE UP (ref 8.4–10.5)
CHLORIDE SERPL-SCNC: 104 MMOL/L — SIGNIFICANT CHANGE UP (ref 98–107)
CO2 SERPL-SCNC: 25 MMOL/L — SIGNIFICANT CHANGE UP (ref 22–31)
CREAT SERPL-MCNC: 0.9 MG/DL — SIGNIFICANT CHANGE UP (ref 0.5–1.3)
EGFR: 114 ML/MIN/1.73M2 — SIGNIFICANT CHANGE UP
EOSINOPHIL # BLD AUTO: 0.42 K/UL — SIGNIFICANT CHANGE UP (ref 0–0.5)
EOSINOPHIL NFR BLD AUTO: 4.5 % — SIGNIFICANT CHANGE UP (ref 0–6)
ETHANOL SERPL-MCNC: <10 MG/DL — SIGNIFICANT CHANGE UP
GLUCOSE SERPL-MCNC: 95 MG/DL — SIGNIFICANT CHANGE UP (ref 70–99)
HCT VFR BLD CALC: 35 % — LOW (ref 39–50)
HGB BLD-MCNC: 10.4 G/DL — LOW (ref 13–17)
IANC: 5.82 K/UL — SIGNIFICANT CHANGE UP (ref 1.8–7.4)
IMM GRANULOCYTES NFR BLD AUTO: 0.2 % — SIGNIFICANT CHANGE UP (ref 0–0.9)
INR BLD: 1.08 RATIO — SIGNIFICANT CHANGE UP (ref 0.85–1.16)
LYMPHOCYTES # BLD AUTO: 2.34 K/UL — SIGNIFICANT CHANGE UP (ref 1–3.3)
LYMPHOCYTES # BLD AUTO: 25.2 % — SIGNIFICANT CHANGE UP (ref 13–44)
MCHC RBC-ENTMCNC: 26 PG — LOW (ref 27–34)
MCHC RBC-ENTMCNC: 29.7 G/DL — LOW (ref 32–36)
MCV RBC AUTO: 87.5 FL — SIGNIFICANT CHANGE UP (ref 80–100)
MONOCYTES # BLD AUTO: 0.63 K/UL — SIGNIFICANT CHANGE UP (ref 0–0.9)
MONOCYTES NFR BLD AUTO: 6.8 % — SIGNIFICANT CHANGE UP (ref 2–14)
NEUTROPHILS # BLD AUTO: 5.82 K/UL — SIGNIFICANT CHANGE UP (ref 1.8–7.4)
NEUTROPHILS NFR BLD AUTO: 62.8 % — SIGNIFICANT CHANGE UP (ref 43–77)
NRBC # BLD AUTO: 0 K/UL — SIGNIFICANT CHANGE UP (ref 0–0)
NRBC # BLD: 0 /100 WBCS — SIGNIFICANT CHANGE UP (ref 0–0)
NRBC # FLD: 0 K/UL — SIGNIFICANT CHANGE UP (ref 0–0)
NRBC BLD-RTO: 0 /100 WBCS — SIGNIFICANT CHANGE UP (ref 0–0)
PLATELET # BLD AUTO: 348 K/UL — SIGNIFICANT CHANGE UP (ref 150–400)
POTASSIUM SERPL-MCNC: 3.8 MMOL/L — SIGNIFICANT CHANGE UP (ref 3.5–5.3)
POTASSIUM SERPL-SCNC: 3.8 MMOL/L — SIGNIFICANT CHANGE UP (ref 3.5–5.3)
PROT SERPL-MCNC: 6.9 G/DL — SIGNIFICANT CHANGE UP (ref 6–8.3)
PROTHROM AB SERPL-ACNC: 12.9 SEC — SIGNIFICANT CHANGE UP (ref 9.9–13.4)
RBC # BLD: 4 M/UL — LOW (ref 4.2–5.8)
RBC # FLD: 15.8 % — HIGH (ref 10.3–14.5)
SALICYLATES SERPL-MCNC: <0.3 MG/DL — LOW (ref 15–30)
SODIUM SERPL-SCNC: 141 MMOL/L — SIGNIFICANT CHANGE UP (ref 135–145)
TOXICOLOGY SCREEN, DRUGS OF ABUSE, SERUM RESULT: SIGNIFICANT CHANGE UP
TROPONIN T, HIGH SENSITIVITY RESULT: 194 NG/L — CRITICAL HIGH
WBC # BLD: 9.28 K/UL — SIGNIFICANT CHANGE UP (ref 3.8–10.5)
WBC # FLD AUTO: 9.28 K/UL — SIGNIFICANT CHANGE UP (ref 3.8–10.5)

## 2025-02-01 PROCEDURE — 99284 EMERGENCY DEPT VISIT MOD MDM: CPT

## 2025-02-01 PROCEDURE — 93970 EXTREMITY STUDY: CPT | Mod: 26

## 2025-02-01 RX ORDER — CARVEDILOL 6.25 MG
6.25 TABLET ORAL ONCE
Refills: 0 | Status: DISCONTINUED | OUTPATIENT
Start: 2025-02-01 | End: 2025-02-01

## 2025-02-01 NOTE — ED PROVIDER NOTE - NSFOLLOWUPINSTRUCTIONS_ED_ALL_ED_FT
continue usual meds   do not take illgeal drugs     - Lab and imaging results, if performed, were discussed with you along with your discharge diagnosis    - Follow up with your doctor in 1 week - bring copies of your results if you were given. If you do not have a primary doctor, please call [phone] to find one convenient for you    - Return to the ED for any new, worsening, or concerning symptoms to you FEVER CHILLS< WORSENING PAIN     - Continue all prescribed medications    - Take ibuprofen/tylenol as directed as needed for pain    - Rest and keep yourself hydrated with fluids

## 2025-02-01 NOTE — ED ADULT NURSE NOTE - CHIEF COMPLAINT QUOTE
Pt found at [facility] wandering. Ambulatory with cane stumbling in triage placed in stretcher. Patient agrees to having history of substance use, but currently not admitting to when last used alcohol/drugs. denies S/I, H/I. Hx substance use disorder, bipolar, anxiety, depression. FS 94.

## 2025-02-01 NOTE — ED PROVIDER NOTE - OBJECTIVE STATEMENT
34-year-old male past medical history of anxiety, depression, bipolar 1, substance use disorder, fentanyl xylazine, recent DVT in the left lower extremity presents to ED brought in by EMS found wandering on the street.  Patient denies any active complaints.  States he is compliant with his Eliquis medication.  Patient does not know why he is in the ED.  Denies any active chest pain nausea vomiting diarrhea chest pain abdominal pain dysuria hematuria leg pain.  Patient endorsing auditory and visual hallucinations.  Patient denying any drug use today, denies fentanyl use, denies alcohol benzo use.  Patient lives with mother.

## 2025-02-01 NOTE — ED ADULT TRIAGE NOTE - AS PAIN REST
[facility] for [address] has been faxed to [phone], confirm rec'd, also original available for pickup @  per Edel's request.
0 (no pain/absence of nonverbal indicators of pain)

## 2025-02-01 NOTE — ED PROVIDER NOTE - CLINICAL SUMMARY MEDICAL DECISION MAKING FREE TEXT BOX
34-year-old male past medical history of anxiety, depression, bipolar 1, substance use disorder, fentanyl xylazine, recent DVT in the left lower extremity presents to ED brought in by EMS found wandering on the street.  Patient denies any active complaints.  States he is compliant with his Eliquis medication.  Patient does not know why he is in the ED.  Denies any active chest pain nausea vomiting diarrhea chest pain abdominal pain dysuria hematuria leg pain.  Patient endorsing auditory and visual hallucinations.  Patient denying any drug use today, denies fentanyl use, denies alcohol benzo use.  Patient lives with mother.    VSS. Clinically stable. PE, well appearing, no acute distress, AAOx3. NCAT, EOMI, PERRL miotic 1-2mm, normal conjunctiva, mucous membranes moist, LCTAB no w/r/c, no MRG, RRR, abd NDNT, no rebound tenderness or guarding, no CVA ttp, no focal neuro deficits but slow to respond, neurovascularly intact, no bruising, rashes, or erythema, bl LE edema +1. Suspicion for substance use/intoxication. Will assess w labs, psych labs, psych cs, LLE doppler. dispo pending Kristian

## 2025-02-01 NOTE — ED PROVIDER NOTE - PATIENT PORTAL LINK FT
You can access the FollowMyHealth Patient Portal offered by [facility] by registering at the following website: [email or web address]. By joining [employer]’s FollowMyHealth portal, you will also be able to view your health information using other applications (apps) compatible with our system.

## 2025-02-01 NOTE — ED ADULT TRIAGE NOTE - CHIEF COMPLAINT QUOTE
Pt found at [facility] wandering. Ambulatory with cane stumbling in triage placed in stretcher. Patient agrees to having history of substance use, but currently not admitting to when last used alcohol/drugs. denies S/I, H/I. Hx substance use disorder, bipolar, anxiety, depression. FS 94.
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
## 2025-02-02 PROBLEM — F19.90 OTHER PSYCHOACTIVE SUBSTANCE USE, UNSPECIFIED, UNCOMPLICATED: Chronic | Status: ACTIVE | Noted: 2025-01-16
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
## 2025-02-02 PROBLEM — F17.200 NICOTINE DEPENDENCE, UNSPECIFIED, UNCOMPLICATED: Chronic | Status: ACTIVE | Noted: 2025-01-16
